# Patient Record
Sex: FEMALE | Race: WHITE | NOT HISPANIC OR LATINO | Employment: OTHER | URBAN - METROPOLITAN AREA
[De-identification: names, ages, dates, MRNs, and addresses within clinical notes are randomized per-mention and may not be internally consistent; named-entity substitution may affect disease eponyms.]

---

## 2017-10-13 ENCOUNTER — HOSPITAL ENCOUNTER (OUTPATIENT)
Dept: RADIOLOGY | Facility: HOSPITAL | Age: 67
Discharge: HOME | End: 2017-10-13
Attending: UROLOGY
Payer: MEDICARE

## 2017-10-13 ENCOUNTER — TRANSCRIBE ORDERS (OUTPATIENT)
Dept: ADMINISTRATIVE | Facility: HOSPITAL | Age: 67
End: 2017-10-13

## 2017-10-13 DIAGNOSIS — Z87.442 PERSONAL HISTORY OF URINARY CALCULI: Primary | ICD-10-CM

## 2017-10-13 DIAGNOSIS — Z87.442 PERSONAL HISTORY OF URINARY CALCULI: ICD-10-CM

## 2017-10-13 PROCEDURE — 74000 HB X-RAY EXAM OF ABDOMEN (SINGLE ANTEROPOSTERIOR VIEW): CPT

## 2018-02-02 ENCOUNTER — TRANSCRIBE ORDERS (OUTPATIENT)
Dept: ADMINISTRATIVE | Facility: HOSPITAL | Age: 68
End: 2018-02-02

## 2018-02-02 DIAGNOSIS — R94.5 NONSPECIFIC ABNORMAL RESULTS OF LIVER FUNCTION STUDY: Primary | ICD-10-CM

## 2018-02-08 ENCOUNTER — HOSPITAL ENCOUNTER (OUTPATIENT)
Dept: RADIOLOGY | Facility: HOSPITAL | Age: 68
Discharge: HOME | End: 2018-02-08
Attending: INTERNAL MEDICINE
Payer: MEDICARE

## 2018-02-08 ENCOUNTER — TRANSCRIBE ORDERS (OUTPATIENT)
Dept: ADMINISTRATIVE | Facility: HOSPITAL | Age: 68
End: 2018-02-08

## 2018-02-08 DIAGNOSIS — D49.0 DUDLEY-KLINGENSTEIN SYNDROME: Primary | ICD-10-CM

## 2018-02-08 DIAGNOSIS — R49.8 NEUROLOGIC VOICE DISORDER: Primary | ICD-10-CM

## 2018-02-08 DIAGNOSIS — R94.5 NONSPECIFIC ABNORMAL RESULTS OF LIVER FUNCTION STUDY: ICD-10-CM

## 2018-02-08 PROCEDURE — 76705 ECHO EXAM OF ABDOMEN: CPT

## 2018-02-14 ENCOUNTER — HOSPITAL ENCOUNTER (OUTPATIENT)
Dept: RADIOLOGY | Facility: HOSPITAL | Age: 68
Discharge: HOME | End: 2018-02-14
Attending: INTERNAL MEDICINE
Payer: MEDICARE

## 2018-02-14 DIAGNOSIS — R49.8 NEUROLOGIC VOICE DISORDER: ICD-10-CM

## 2018-02-14 PROCEDURE — A9585 GADOBUTROL INJECTION: HCPCS | Performed by: RADIOLOGY

## 2018-02-14 PROCEDURE — 74183 MRI ABD W/O CNTR FLWD CNTR: CPT

## 2018-02-14 PROCEDURE — 76376 3D RENDER W/INTRP POSTPROCES: CPT

## 2018-02-14 RX ADMIN — GADOBUTROL 6 ML: 604.72 INJECTION INTRAVENOUS at 17:35

## 2018-09-10 ENCOUNTER — TRANSCRIBE ORDERS (OUTPATIENT)
Dept: ADMINISTRATIVE | Facility: HOSPITAL | Age: 68
End: 2018-09-10

## 2018-09-10 DIAGNOSIS — Z12.39 SCREENING BREAST EXAMINATION: ICD-10-CM

## 2018-09-10 DIAGNOSIS — M81.0 OSTEOPOROSIS, UNSPECIFIED OSTEOPOROSIS TYPE, UNSPECIFIED PATHOLOGICAL FRACTURE PRESENCE: Primary | ICD-10-CM

## 2018-09-18 ENCOUNTER — HOSPITAL ENCOUNTER (OUTPATIENT)
Dept: RADIOLOGY | Facility: HOSPITAL | Age: 68
Discharge: HOME | End: 2018-09-18
Attending: FAMILY MEDICINE
Payer: MEDICARE

## 2018-09-18 DIAGNOSIS — M81.0 OSTEOPOROSIS, UNSPECIFIED OSTEOPOROSIS TYPE, UNSPECIFIED PATHOLOGICAL FRACTURE PRESENCE: ICD-10-CM

## 2018-09-18 PROCEDURE — 77080 DXA BONE DENSITY AXIAL: CPT

## 2018-09-26 ENCOUNTER — HOSPITAL ENCOUNTER (OUTPATIENT)
Dept: RADIOLOGY | Facility: HOSPITAL | Age: 68
Discharge: HOME | End: 2018-09-26
Attending: FAMILY MEDICINE
Payer: MEDICARE

## 2018-09-26 DIAGNOSIS — Z12.39 SCREENING BREAST EXAMINATION: ICD-10-CM

## 2018-09-26 PROCEDURE — 77067 SCR MAMMO BI INCL CAD: CPT

## 2019-02-05 ENCOUNTER — TRANSCRIBE ORDERS (OUTPATIENT)
Dept: ADMINISTRATIVE | Facility: HOSPITAL | Age: 69
End: 2019-02-05

## 2019-02-05 DIAGNOSIS — K86.89 PANCREATIC MASS: Primary | ICD-10-CM

## 2019-02-08 ENCOUNTER — HOSPITAL ENCOUNTER (OUTPATIENT)
Dept: RADIOLOGY | Facility: HOSPITAL | Age: 69
Discharge: HOME | End: 2019-02-08
Attending: INTERNAL MEDICINE
Payer: MEDICARE

## 2019-02-08 DIAGNOSIS — K86.89 PANCREATIC MASS: ICD-10-CM

## 2019-02-08 PROCEDURE — 76705 ECHO EXAM OF ABDOMEN: CPT

## 2019-12-20 ENCOUNTER — TRANSCRIBE ORDERS (OUTPATIENT)
Dept: ADMINISTRATIVE | Facility: HOSPITAL | Age: 69
End: 2019-12-20

## 2019-12-20 DIAGNOSIS — Z12.31 VISIT FOR SCREENING MAMMOGRAM: Primary | ICD-10-CM

## 2020-01-28 ENCOUNTER — HOSPITAL ENCOUNTER (OUTPATIENT)
Dept: RADIOLOGY | Facility: HOSPITAL | Age: 70
Discharge: HOME | End: 2020-01-28
Attending: FAMILY MEDICINE
Payer: MEDICARE

## 2020-01-28 DIAGNOSIS — Z12.31 VISIT FOR SCREENING MAMMOGRAM: ICD-10-CM

## 2020-01-28 PROCEDURE — 77067 SCR MAMMO BI INCL CAD: CPT

## 2020-02-14 ENCOUNTER — HOSPITAL ENCOUNTER (OUTPATIENT)
Dept: RADIOLOGY | Facility: HOSPITAL | Age: 70
Discharge: HOME | End: 2020-02-14
Attending: FAMILY MEDICINE
Payer: MEDICARE

## 2020-02-14 VITALS — HEIGHT: 71 IN | BODY MASS INDEX: 18.9 KG/M2 | WEIGHT: 135 LBS

## 2020-02-14 DIAGNOSIS — R92.8 ABNORMAL SCREENING MAMMOGRAM: ICD-10-CM

## 2020-02-14 PROCEDURE — 76642 ULTRASOUND BREAST LIMITED: CPT

## 2020-02-14 PROCEDURE — 77065 DX MAMMO INCL CAD UNI: CPT

## 2020-02-14 PROCEDURE — G0279 TOMOSYNTHESIS, MAMMO: HCPCS

## 2021-03-01 DIAGNOSIS — Z23 ENCOUNTER FOR IMMUNIZATION: ICD-10-CM

## 2022-03-03 ENCOUNTER — TELEPHONE (OUTPATIENT)
Dept: GASTROENTEROLOGY | Facility: CLINIC | Age: 72
End: 2022-03-03

## 2022-03-03 NOTE — TELEPHONE ENCOUNTER
Recall call went out via [place] in 4/2021 with no return calls from pt to schedule  Pt is due for an appt with Dr Alyse Gross for f/u to liver/elevated lft's  I lmom for pt to please call back to schedule an appt with Dr Alyse Gross  Will call pt again in one week if do not hear back from her

## 2022-03-04 ENCOUNTER — TELEPHONE (OUTPATIENT)
Dept: GASTROENTEROLOGY | Facility: CLINIC | Age: 72
End: 2022-03-04

## 2022-03-04 NOTE — TELEPHONE ENCOUNTER
Spoke to pt whom was due for a colon with Dr Mariia Fleming for hx of polyps and she informed she had the cologuard test instead  When speaking to her she informed had MRI of Pancreas ordered by Dr Bella Pennington and she would like to know if cyst has grown or not  Could you please call her back after 1pm today as she will not be home until then  Thank you so much!

## 2022-03-22 ENCOUNTER — OFFICE VISIT (OUTPATIENT)
Dept: GASTROENTEROLOGY | Facility: CLINIC | Age: 72
End: 2022-03-22
Payer: MEDICARE

## 2022-03-22 VITALS
HEART RATE: 78 BPM | BODY MASS INDEX: 18.47 KG/M2 | SYSTOLIC BLOOD PRESSURE: 128 MMHG | WEIGHT: 129 LBS | HEIGHT: 70 IN | DIASTOLIC BLOOD PRESSURE: 82 MMHG

## 2022-03-22 DIAGNOSIS — Z12.11 ENCOUNTER FOR COLORECTAL CANCER SCREENING: ICD-10-CM

## 2022-03-22 DIAGNOSIS — Z12.12 ENCOUNTER FOR COLORECTAL CANCER SCREENING: ICD-10-CM

## 2022-03-22 DIAGNOSIS — K86.2 PANCREAS CYST: Primary | ICD-10-CM

## 2022-03-22 PROCEDURE — 99214 OFFICE O/P EST MOD 30 MIN: CPT | Performed by: PHYSICIAN ASSISTANT

## 2022-03-22 RX ORDER — GABAPENTIN 300 MG/1
300 CAPSULE ORAL 3 TIMES DAILY
COMMUNITY
Start: 2022-03-22

## 2022-03-22 NOTE — PROGRESS NOTES
[facility] - Outpatient Follow-up Note  Cata Bentley 66 y o  female MRN: [id number]  Encounter: [id number]          ASSESSMENT AND PLAN:      1  Pancreas cyst  Patient with history of stable subcentimeter pancreas cyst and did have stable imaging since 2013 although I do not have record of all of those they were compared by Radiology  This will be 9 years after diagnosis so if this remains stable and no further follow-up will be recommended but we will proceed with MRI MRCP  We will make further recommendations once this has been performed  Have asked patient to get blood work including kidney function through her primary doctor since she is going to see him and then schedule the MRI thereafter   - MRI abdomen w wo contrast and mrcp; Future    2  Encounter for colorectal cancer screening  Patient has had colonoscopy last in 2015 which had shown hyperplastic diminutive polyps  No family history of colorectal cancer  Patient did notice a slight change in bowel habits in the recent past I did tell her if this persists and she will need a colonoscopy sooner but otherwise we can plan for 2025  Advised her if she has any nocturnal symptoms or continued weight loss or rectal bleeding that she should contact us right away     ______________________________________________________________________    SUBJECTIVE:    This is a 58-year-old female who presents today for follow-up to pancreas cyst   Patient has had imaging done and last ultrasound was performed in 2019 and at that time she had a stable subcentimeter pancreatic cyst which was stable for 6 years and it had recommended yearly follow-up for 5 years in 2 years for 2 times and then if the cyst was stable after 9 years and no follow-up was recommended  Patient was actually due to have imaging in 2021 and this was either with an abdominal MRI or CT    Patient otherwise has lost some weight which she states she has not been eating as much   Had blood work last year and is going to follow up with Dr Jeffrey Chaudhary in the near future for blood work  She otherwise states that about 8 months ago she has noted some looser stools  Colonoscopy was last performed in 2015 which had shown some hyperplastic polyps  But they were diminutive  She otherwise was recommended to come back in 2022 but patient reports that she had a Cologuard within the past year which was negative  Patient denies any family history of colon cancer  She only has a bowel 1-2 loose stools a day  Has been trying probiotic as well as increasing fiber  Denies any change in medications but does just take supplements over-the-counter  REVIEW OF SYSTEMS IS OTHERWISE NEGATIVE  Historical Information   History reviewed  No pertinent past medical history  History reviewed  No pertinent surgical history  Social History   Social History     Substance and Sexual Activity   Alcohol Use Yes     Social History     Substance and Sexual Activity   Drug Use Never     Social History     Tobacco Use   Smoking Status Never Smoker   Smokeless Tobacco Never Used     Family History   Problem Relation Age of Onset    No Known Problems Mother     No Known Problems Father     No Known Problems Sister     No Known Problems Daughter     No Known Problems Maternal Grandmother     No Known Problems Maternal Grandfather     No Known Problems Paternal Grandmother     No Known Problems Paternal Grandfather     No Known Problems Sister     No Known Problems Sister     No Known Problems Sister     No Known Problems Paternal Aunt        Meds/Allergies       Current Outpatient Medications:     gabapentin (NEURONTIN) 300 mg capsule    No Known Allergies        Objective     Blood pressure 128/82, pulse 78, height 5' 10" (1 778 m), weight 58 5 kg (129 lb)  Body mass index is 18 51 kg/m²        PHYSICAL EXAM:      General Appearance:   Alert, cooperative, no distress   HEENT:   Normocephalic, atraumatic, anicteric      Neck:  Supple, symmetrical, trachea midline   Lungs:   Clear to auscultation bilaterally; no rales, rhonchi or wheezing; respirations unlabored    Heart[de-identified]   Regular rate and rhythm; no murmur, rub, or gallop  Abdomen:   Soft, non-tender, non-distended; normal bowel sounds; no masses, no organomegaly    Genitalia:   Deferred    Rectal:   Deferred    Extremities:  No cyanosis, clubbing or edema    Pulses:  2+ and symmetric    Skin:  No jaundice, rashes, or lesions    Lymph nodes:  No palpable cervical lymphadenopathy        Lab Results:   No visits with results within 1 Day(s) from this visit  Latest known visit with results is:   No results found for any previous visit  Radiology Results:   No results found

## 2022-03-23 ENCOUNTER — HOSPITAL ENCOUNTER (OUTPATIENT)
Dept: RADIOLOGY | Facility: HOSPITAL | Age: 72
Discharge: HOME | End: 2022-03-23
Attending: FAMILY MEDICINE
Payer: MEDICARE

## 2022-03-23 VITALS — HEIGHT: 69 IN | BODY MASS INDEX: 18.51 KG/M2 | WEIGHT: 125 LBS

## 2022-03-23 DIAGNOSIS — Z12.31 ENCOUNTER FOR SCREENING MAMMOGRAM FOR MALIGNANT NEOPLASM OF BREAST: ICD-10-CM

## 2022-03-23 DIAGNOSIS — Z13.820 ENCOUNTER FOR SCREENING FOR OSTEOPOROSIS: ICD-10-CM

## 2022-03-23 PROCEDURE — 77080 DXA BONE DENSITY AXIAL: CPT

## 2022-03-23 PROCEDURE — 77063 BREAST TOMOSYNTHESIS BI: CPT

## 2022-03-23 PROCEDURE — 77067 SCR MAMMO BI INCL CAD: CPT

## 2022-04-19 ENCOUNTER — TELEPHONE (OUTPATIENT)
Dept: GASTROENTEROLOGY | Facility: CLINIC | Age: 72
End: 2022-04-19

## 2022-04-19 NOTE — TELEPHONE ENCOUNTER
SPOKE WITH PT, REQUESTING BLOOD WORK RX FOR MRI/MRCP  RX MAILED TO PT, PREFERS TO HAVE THIS DONE AT [facility]

## 2022-08-01 ENCOUNTER — HOSPITAL ENCOUNTER (OUTPATIENT)
Dept: RADIOLOGY | Facility: HOSPITAL | Age: 72
Discharge: HOME | End: 2022-08-01
Payer: MEDICARE

## 2022-08-01 DIAGNOSIS — K86.2 PANCREAS CYST: ICD-10-CM

## 2022-08-01 PROCEDURE — A9585 GADOBUTROL INJECTION: HCPCS | Performed by: PHYSICIAN ASSISTANT

## 2022-08-01 PROCEDURE — 74183 MRI ABD W/O CNTR FLWD CNTR: CPT

## 2022-08-01 RX ADMIN — GADOBUTROL 6 ML: 604.72 INJECTION INTRAVENOUS at 15:52

## 2023-02-27 ENCOUNTER — TELEPHONE (OUTPATIENT)
Dept: GASTROENTEROLOGY | Facility: CLINIC | Age: 73
End: 2023-02-27

## 2023-02-27 NOTE — TELEPHONE ENCOUNTER
I spoke to pt and she will call back to r/s appt from 3/1/2023   Possibly 3/15/2023 or 3/16 with Jose Left in [place]   Thanks Kodi Galicia

## 2023-03-06 NOTE — TELEPHONE ENCOUNTER
Left message for patient to call and reschedule her 3/1 appointment that was cancelled due to change in schedule  Will try one more time in 2 weeks if she doesn't call to reschedule

## 2023-04-26 ENCOUNTER — OFFICE VISIT (OUTPATIENT)
Dept: FAMILY MEDICINE CLINIC | Facility: CLINIC | Age: 73
End: 2023-04-26

## 2023-04-26 VITALS
TEMPERATURE: 97.6 F | HEIGHT: 70 IN | BODY MASS INDEX: 18.56 KG/M2 | HEART RATE: 72 BPM | SYSTOLIC BLOOD PRESSURE: 120 MMHG | RESPIRATION RATE: 16 BRPM | DIASTOLIC BLOOD PRESSURE: 88 MMHG | WEIGHT: 129.6 LBS

## 2023-04-26 DIAGNOSIS — Z12.39 SCREENING BREAST EXAMINATION: ICD-10-CM

## 2023-04-26 DIAGNOSIS — Z00.00 MEDICARE ANNUAL WELLNESS VISIT, SUBSEQUENT: Primary | ICD-10-CM

## 2023-04-26 DIAGNOSIS — Z12.31 ENCOUNTER FOR SCREENING MAMMOGRAM FOR MALIGNANT NEOPLASM OF BREAST: ICD-10-CM

## 2023-04-26 DIAGNOSIS — Z13.6 SCREENING FOR CARDIOVASCULAR CONDITION: ICD-10-CM

## 2023-04-26 DIAGNOSIS — Z11.59 ENCOUNTER FOR HEPATITIS C SCREENING TEST FOR LOW RISK PATIENT: ICD-10-CM

## 2023-04-26 DIAGNOSIS — R53.83 OTHER FATIGUE: ICD-10-CM

## 2023-04-26 NOTE — PROGRESS NOTES
Assessment and Plan:     Problem List Items Addressed This Visit    None  Visit Diagnoses     Medicare annual wellness visit, subsequent    -  Primary    Other fatigue        Relevant Orders    TSH, 3rd generation with Free T4 reflex    CBC and differential    Screening for cardiovascular condition        Relevant Orders    Comprehensive metabolic panel    Lipid panel    Encounter for hepatitis C screening test for low risk patient        Relevant Orders    Hepatitis C antibody    Screening breast examination        Relevant Orders    Mammo screening bilateral w 3d & cad    Encounter for screening mammogram for malignant neoplasm of breast        Relevant Orders    Mammo screening bilateral w 3d & cad           Preventive health issues were discussed with patient, and age appropriate screening tests were ordered as noted in patient's After Visit Summary  Personalized health advice and appropriate referrals for health education or preventive services given if needed, as noted in patient's After Visit Summary  History of Present Illness:     Patient presents for a Medicare Wellness Visit    NP, here for AWV  Has history of bulging disc in the lumbar region  This gives her some pain but she uses topicals and feels she can control things  Declines PT  Has loose bm's for about a year, has tried bran, soluble and insoluble fiber, immodium, apple vinegar with lemon/honey/water  Up to date with colonoscopy  Watches her granddaughter 5 days a week and she is 3year old  Tried to get an appointment with Dr Dinora Crawford but it got cancelled  She is going to call again  Patient Care Team:  Kaylah Paulino MD as PCP - General (Internal Medicine)     Review of Systems:     Review of Systems   Constitutional: Negative for chills and fever  HENT: Negative for ear pain and sore throat  Eyes: Negative for pain and visual disturbance  Respiratory: Negative for cough and shortness of breath      Cardiovascular: Negative for chest pain and palpitations  Gastrointestinal: Negative for abdominal pain and vomiting  Genitourinary: Negative for dysuria and hematuria  Musculoskeletal: Positive for back pain  Negative for arthralgias  Skin: Negative for color change and rash  Neurological: Negative for seizures and syncope  All other systems reviewed and are negative  Problem List:     There is no problem list on file for this patient  Past Medical and Surgical History:     History reviewed  No pertinent past medical history    Past Surgical History:   Procedure Laterality Date   • EYE SURGERY     • HERNIA REPAIR     • KNEE SURGERY     • TONSILLECTOMY        Family History:     Family History   Problem Relation Age of Onset   • Alcohol abuse Mother    • Alcohol abuse Father    • No Known Problems Sister    • No Known Problems Sister    • No Known Problems Sister    • No Known Problems Sister    • No Known Problems Daughter    • No Known Problems Maternal Grandmother    • No Known Problems Maternal Grandfather    • No Known Problems Paternal Grandmother    • No Known Problems Paternal Grandfather    • No Known Problems Paternal Aunt       Social History:     Social History     Socioeconomic History   • Marital status: /Civil Union     Spouse name: None   • Number of children: None   • Years of education: None   • Highest education level: None   Occupational History   • None   Tobacco Use   • Smoking status: Never   • Smokeless tobacco: Never   Vaping Use   • Vaping Use: Never used   Substance and Sexual Activity   • Alcohol use: Yes     Comment: rarely   • Drug use: Never   • Sexual activity: Yes     Partners: Male   Other Topics Concern   • None   Social History Narrative   • None     Social Determinants of Health     Financial Resource Strain: Low Risk    • Difficulty of Paying Living Expenses: Not hard at all   Food Insecurity: Not on file   Transportation Needs: No Transportation Needs   • Lack of Transportation (Medical): No   • Lack of Transportation (Non-Medical): No   Physical Activity: Not on file   Stress: Not on file   Social Connections: Not on file   Intimate Partner Violence: Not on file   Housing Stability: Not on file      Medications and Allergies:     Current Outpatient Medications   Medication Sig Dispense Refill   • gabapentin (NEURONTIN) 300 mg capsule Take 300 mg by mouth Three times a day Patient is taking once daily at night       No current facility-administered medications for this visit  No Known Allergies   Immunizations:     Immunization History   Administered Date(s) Administered   • Td (adult), Unspecified 11/28/2014   • Tetanus Toxoid, Unspecified 12/31/2002      Health Maintenance:         Topic Date Due   • Hepatitis C Screening  Never done   • Colorectal Cancer Screening  03/20/2018   • Breast Cancer Screening: Mammogram  03/23/2023         Topic Date Due   • COVID-19 Vaccine (1) Never done   • Pneumococcal Vaccine: 65+ Years (1 - PCV) Never done   • Influenza Vaccine (1) Never done      Medicare Screening Tests and Risk Assessments:     Astrid Rossi is here for her Subsequent Wellness visit  Health Risk Assessment:   Patient rates overall health as very good  Patient feels that their physical health rating is same  Patient is satisfied with their life  Eyesight was rated as same  Hearing was rated as same  Patient feels that their emotional and mental health rating is same  Patients states they are never, rarely angry  Patient states they are sometimes unusually tired/fatigued  Pain experienced in the last 7 days has been some  Patient's pain rating has been 6/10  Patient states that she has experienced no weight loss or gain in last 6 months  Depression Screening:   PHQ-2 Score: 0      Fall Risk Screening:    In the past year, patient has experienced: no history of falling in past year      Urinary Incontinence Screening:   Patient has not leaked urine accidently in the last six months  Home Safety:  Patient does not have trouble with stairs inside or outside of their home  Patient has working smoke alarms and has working carbon monoxide detector  Home safety hazards include: none  Nutrition:   Current diet is Regular  Medications:   Patient is not currently taking any over-the-counter supplements  Patient is able to manage medications  Activities of Daily Living (ADLs)/Instrumental Activities of Daily Living (IADLs):   Walk and transfer into and out of bed and chair?: Yes  Dress and groom yourself?: Yes    Bathe or shower yourself?: Yes    Feed yourself? Yes  Do your laundry/housekeeping?: Yes  Manage your money, pay your bills and track your expenses?: Yes  Make your own meals?: Yes    Do your own shopping?: Yes    Previous Hospitalizations:   Any hospitalizations or ED visits within the last 12 months?: No      Advance Care Planning:   Living will: Yes    Durable POA for healthcare:  Yes    Advanced directive: Yes    End of Life Decisions reviewed with patient: Yes      Cognitive Screening:   Provider or family/friend/caregiver concerned regarding cognition?: No    PREVENTIVE SCREENINGS      Cardiovascular Screening:    General: Risks and Benefits Discussed    Due for: Lipid Panel      Diabetes Screening:     General: Risks and Benefits Discussed    Due for: Blood Glucose      Colorectal Cancer Screening:     General: Screening Current      Breast Cancer Screening:     General: Screening Current      Cervical Cancer Screening:    General: Screening Not Indicated      Osteoporosis Screening:    General: Screening Current      Abdominal Aortic Aneurysm (AAA) Screening:        General: Screening Not Indicated      Lung Cancer Screening:     General: Screening Not Indicated    Screening, Brief Intervention, and Referral to Treatment (SBIRT)    Screening  Typical number of drinks in a day: 0  Typical number of drinks in a week: 0  Interpretation: Low risk drinking "behavior  Single Item Drug Screening:  How often have you used an illegal drug (including marijuana) or a prescription medication for non-medical reasons in the past year? never    Single Item Drug Screen Score: 0  Interpretation: Negative screen for possible drug use disorder    Brief Intervention  Alcohol & drug use screenings were reviewed  No concerns regarding substance use disorder identified  Other Counseling Topics:   Car/seat belt/driving safety, skin self-exam, sunscreen and regular weightbearing exercise and calcium and vitamin D intake  No results found  Physical Exam:     /88   Pulse 72   Temp 97 6 °F (36 4 °C) (Tympanic)   Resp 16   Ht 5' 9 5\" (1 765 m)   Wt 58 8 kg (129 lb 9 6 oz)   BMI 18 86 kg/m²     Physical Exam  Vitals and nursing note reviewed  Constitutional:       General: She is not in acute distress  Appearance: She is well-developed  HENT:      Head: Normocephalic and atraumatic  Eyes:      Conjunctiva/sclera: Conjunctivae normal    Cardiovascular:      Rate and Rhythm: Normal rate and regular rhythm  Heart sounds: No murmur heard  Pulmonary:      Effort: Pulmonary effort is normal  No respiratory distress  Breath sounds: Normal breath sounds  Abdominal:      Palpations: Abdomen is soft  Tenderness: There is no abdominal tenderness  Musculoskeletal:         General: No swelling  Cervical back: Neck supple  Skin:     General: Skin is warm and dry  Capillary Refill: Capillary refill takes less than 2 seconds  Neurological:      Mental Status: She is alert     Psychiatric:         Mood and Affect: Mood normal           Francis Orozco MD  "

## 2023-04-26 NOTE — PATIENT INSTRUCTIONS
Medicare Preventive Visit Patient Instructions  Thank you for completing your Welcome to Medicare Visit or Medicare Annual Wellness Visit today  Your next wellness visit will be due in one year (4/26/2024)  The screening/preventive services that you may require over the next 5-10 years are detailed below  Some tests may not apply to you based off risk factors and/or age  Screening tests ordered at today's visit but not completed yet may show as past due  Also, please note that scanned in results may not display below  Preventive Screenings:  Service Recommendations Previous Testing/Comments   Colorectal Cancer Screening  * Colonoscopy    * Fecal Occult Blood Test (FOBT)/Fecal Immunochemical Test (FIT)  * Fecal DNA/Cologuard Test  * Flexible Sigmoidoscopy Age: 39-70 years old   Colonoscopy: every 10 years (may be performed more frequently if at higher risk)  OR  FOBT/FIT: every 1 year  OR  Cologuard: every 3 years  OR  Sigmoidoscopy: every 5 years  Screening may be recommended earlier than age 39 if at higher risk for colorectal cancer  Also, an individualized decision between you and your healthcare provider will decide whether screening between the ages of 74-80 would be appropriate  Colonoscopy: 03/24/2015  FOBT/FIT: Not on file  Cologuard: Not on file  Sigmoidoscopy: Not on file    Screening Current     Breast Cancer Screening Age: 36 years old  Frequency: every 1-2 years  Not required if history of left and right mastectomy Mammogram: 03/23/2022    Screening Current   Cervical Cancer Screening Between the ages of 21-29, pap smear recommended once every 3 years  Between the ages of 33-67, can perform pap smear with HPV co-testing every 5 years     Recommendations may differ for women with a history of total hysterectomy, cervical cancer, or abnormal pap smears in past  Pap Smear: Not on file    Screening Not Indicated   Hepatitis C Screening Once for adults born between 1945 and 1965  More frequently in patients at high risk for Hepatitis C Hep C Antibody: Not on file        Diabetes Screening 1-2 times per year if you're at risk for diabetes or have pre-diabetes Fasting glucose: No results in last 5 years (No results in last 5 years)  A1C: No results in last 5 years (No results in last 5 years)      Cholesterol Screening Once every 5 years if you don't have a lipid disorder  May order more often based on risk factors  Lipid panel: Not on file          Other Preventive Screenings Covered by Medicare:  Abdominal Aortic Aneurysm (AAA) Screening: covered once if your at risk  You're considered to be at risk if you have a family history of AAA  Lung Cancer Screening: covers low dose CT scan once per year if you meet all of the following conditions: (1) Age 50-69; (2) No signs or symptoms of lung cancer; (3) Current smoker or have quit smoking within the last 15 years; (4) You have a tobacco smoking history of at least 20 pack years (packs per day multiplied by number of years you smoked); (5) You get a written order from a healthcare provider  Glaucoma Screening: covered annually if you're considered high risk: (1) You have diabetes OR (2) Family history of glaucoma OR (3)  aged 48 and older OR (3)  American aged 72 and older  Osteoporosis Screening: covered every 2 years if you meet one of the following conditions: (1) You're estrogen deficient and at risk for osteoporosis based off medical history and other findings; (2) Have a vertebral abnormality; (3) On glucocorticoid therapy for more than 3 months; (4) Have primary hyperparathyroidism; (5) On osteoporosis medications and need to assess response to drug therapy  Last bone density test (DXA Scan): 03/23/2022  HIV Screening: covered annually if you're between the age of 12-76  Also covered annually if you are younger than 13 and older than 72 with risk factors for HIV infection   For pregnant patients, it is covered up to 3 times per pregnancy  Immunizations:  Immunization Recommendations   Influenza Vaccine Annual influenza vaccination during flu season is recommended for all persons aged >= 6 months who do not have contraindications   Pneumococcal Vaccine   * Pneumococcal conjugate vaccine = PCV13 (Prevnar 13), PCV15 (Vaxneuvance), PCV20 (Prevnar 20)  * Pneumococcal polysaccharide vaccine = PPSV23 (Pneumovax) Adults 25-60 years old: 1-3 doses may be recommended based on certain risk factors  Adults 72 years old: 1-2 doses may be recommended based off what pneumonia vaccine you previously received   Hepatitis B Vaccine 3 dose series if at intermediate or high risk (ex: diabetes, end stage renal disease, liver disease)   Tetanus (Td) Vaccine - COST NOT COVERED BY MEDICARE PART B Following completion of primary series, a booster dose should be given every 10 years to maintain immunity against tetanus  Td may also be given as tetanus wound prophylaxis  Tdap Vaccine - COST NOT COVERED BY MEDICARE PART B Recommended at least once for all adults  For pregnant patients, recommended with each pregnancy  Shingles Vaccine (Shingrix) - COST NOT COVERED BY MEDICARE PART B  2 shot series recommended in those aged 48 and above     Health Maintenance Due:      Topic Date Due    Hepatitis C Screening  Never done    Colorectal Cancer Screening  03/20/2018    Breast Cancer Screening: Mammogram  03/23/2023     Immunizations Due:      Topic Date Due    COVID-19 Vaccine (1) Never done    Pneumococcal Vaccine: 65+ Years (1 - PCV) Never done    Influenza Vaccine (1) Never done     Advance Directives   What are advance directives? Advance directives are legal documents that state your wishes and plans for medical care  These plans are made ahead of time in case you lose your ability to make decisions for yourself  Advance directives can apply to any medical decision, such as the treatments you want, and if you want to donate organs     What are the types of advance directives? There are many types of advance directives, and each state has rules about how to use them  You may choose a combination of any of the following:  Living will: This is a written record of the treatment you want  You can also choose which treatments you do not want, which to limit, and which to stop at a certain time  This includes surgery, medicine, IV fluid, and tube feedings  Durable power of  for healthcare [facility]): This is a written record that states who you want to make healthcare choices for you when you are unable to make them for yourself  This person, called a proxy, is usually a family member or a friend  You may choose more than 1 proxy  Do not resuscitate (DNR) order:  A DNR order is used in case your heart stops beating or you stop breathing  It is a request not to have certain forms of treatment, such as CPR  A DNR order may be included in other types of advance directives  Medical directive: This covers the care that you want if you are in a coma, near death, or unable to make decisions for yourself  You can list the treatments you want for each condition  Treatment may include pain medicine, surgery, blood transfusions, dialysis, IV or tube feedings, and a ventilator (breathing machine)  Values history: This document has questions about your views, beliefs, and how you feel and think about life  This information can help others choose the care that you would choose  Why are advance directives important? An advance directive helps you control your care  Although spoken wishes may be used, it is better to have your wishes written down  Spoken wishes can be misunderstood, or not followed  Treatments may be given even if you do not want them  An advance directive may make it easier for your family to make difficult choices about your care         © Copyright [employer] 2018 Information is for End User's use only and may not be sold, redistributed or otherwise used for commercial purposes   All illustrations and images included in CareNotes® are the copyrighted property of [employer]  or [address] [place] [address]

## 2023-05-06 ENCOUNTER — APPOINTMENT (OUTPATIENT)
Dept: LAB | Facility: HOSPITAL | Age: 73
End: 2023-05-06
Attending: INTERNAL MEDICINE

## 2023-05-06 DIAGNOSIS — R53.83 OTHER FATIGUE: ICD-10-CM

## 2023-05-06 DIAGNOSIS — Z13.6 SCREENING FOR CARDIOVASCULAR CONDITION: ICD-10-CM

## 2023-05-06 DIAGNOSIS — Z11.59 ENCOUNTER FOR HEPATITIS C SCREENING TEST FOR LOW RISK PATIENT: ICD-10-CM

## 2023-05-06 LAB
ALBUMIN SERPL BCP-MCNC: 4.2 G/DL (ref 3.5–5)
ALP SERPL-CCNC: 68 U/L (ref 34–104)
ALT SERPL W P-5'-P-CCNC: 23 U/L (ref 7–52)
ANION GAP SERPL CALCULATED.3IONS-SCNC: 5 MMOL/L (ref 4–13)
AST SERPL W P-5'-P-CCNC: 26 U/L (ref 13–39)
BASOPHILS # BLD AUTO: 0.03 THOUSANDS/ÂΜL (ref 0–0.1)
BASOPHILS NFR BLD AUTO: 1 % (ref 0–1)
BILIRUB SERPL-MCNC: 0.71 MG/DL (ref 0.2–1)
BUN SERPL-MCNC: 21 MG/DL (ref 5–25)
CALCIUM SERPL-MCNC: 9.3 MG/DL (ref 8.4–10.2)
CHLORIDE SERPL-SCNC: 104 MMOL/L (ref 96–108)
CHOLEST SERPL-MCNC: 201 MG/DL
CO2 SERPL-SCNC: 30 MMOL/L (ref 21–32)
CREAT SERPL-MCNC: 0.88 MG/DL (ref 0.6–1.3)
EOSINOPHIL # BLD AUTO: 0.12 THOUSAND/ÂΜL (ref 0–0.61)
EOSINOPHIL NFR BLD AUTO: 3 % (ref 0–6)
ERYTHROCYTE [DISTWIDTH] IN BLOOD BY AUTOMATED COUNT: 13.2 % (ref 11.6–15.1)
GFR SERPL CREATININE-BSD FRML MDRD: 65 ML/MIN/1.73SQ M
GLUCOSE P FAST SERPL-MCNC: 79 MG/DL (ref 65–99)
HCT VFR BLD AUTO: 43 % (ref 34.8–46.1)
HCV AB SER QL: NORMAL
HDLC SERPL-MCNC: 79 MG/DL
HGB BLD-MCNC: 14.1 G/DL (ref 11.5–15.4)
IMM GRANULOCYTES # BLD AUTO: 0.01 THOUSAND/UL (ref 0–0.2)
IMM GRANULOCYTES NFR BLD AUTO: 0 % (ref 0–2)
LDLC SERPL CALC-MCNC: 109 MG/DL (ref 0–100)
LYMPHOCYTES # BLD AUTO: 1.87 THOUSANDS/ÂΜL (ref 0.6–4.47)
LYMPHOCYTES NFR BLD AUTO: 41 % (ref 14–44)
MCH RBC QN AUTO: 32.5 PG (ref 26.8–34.3)
MCHC RBC AUTO-ENTMCNC: 32.8 G/DL (ref 31.4–37.4)
MCV RBC AUTO: 99 FL (ref 82–98)
MONOCYTES # BLD AUTO: 0.41 THOUSAND/ÂΜL (ref 0.17–1.22)
MONOCYTES NFR BLD AUTO: 9 % (ref 4–12)
NEUTROPHILS # BLD AUTO: 2.15 THOUSANDS/ÂΜL (ref 1.85–7.62)
NEUTS SEG NFR BLD AUTO: 46 % (ref 43–75)
NONHDLC SERPL-MCNC: 122 MG/DL
NRBC BLD AUTO-RTO: 0 /100 WBCS
PLATELET # BLD AUTO: 218 THOUSANDS/UL (ref 149–390)
PMV BLD AUTO: 9.9 FL (ref 8.9–12.7)
POTASSIUM SERPL-SCNC: 4.4 MMOL/L (ref 3.5–5.3)
PROT SERPL-MCNC: 7.1 G/DL (ref 6.4–8.4)
RBC # BLD AUTO: 4.34 MILLION/UL (ref 3.81–5.12)
SODIUM SERPL-SCNC: 139 MMOL/L (ref 135–147)
TRIGL SERPL-MCNC: 66 MG/DL
TSH SERPL DL<=0.05 MIU/L-ACNC: 2.5 UIU/ML (ref 0.45–4.5)
WBC # BLD AUTO: 4.59 THOUSAND/UL (ref 4.31–10.16)

## 2023-08-23 ENCOUNTER — OFFICE VISIT (OUTPATIENT)
Dept: FAMILY MEDICINE CLINIC | Facility: CLINIC | Age: 73
End: 2023-08-23
Payer: MEDICARE

## 2023-08-23 VITALS
HEIGHT: 70 IN | WEIGHT: 132.8 LBS | DIASTOLIC BLOOD PRESSURE: 88 MMHG | RESPIRATION RATE: 18 BRPM | HEART RATE: 99 BPM | TEMPERATURE: 97 F | SYSTOLIC BLOOD PRESSURE: 132 MMHG | BODY MASS INDEX: 19.01 KG/M2

## 2023-08-23 DIAGNOSIS — M54.50 CHRONIC MIDLINE LOW BACK PAIN WITHOUT SCIATICA: ICD-10-CM

## 2023-08-23 DIAGNOSIS — M79.10 MYALGIA: ICD-10-CM

## 2023-08-23 DIAGNOSIS — G89.29 CHRONIC MIDLINE LOW BACK PAIN WITHOUT SCIATICA: ICD-10-CM

## 2023-08-23 DIAGNOSIS — R53.83 OTHER FATIGUE: Primary | ICD-10-CM

## 2023-08-23 PROCEDURE — 99213 OFFICE O/P EST LOW 20 MIN: CPT | Performed by: NURSE PRACTITIONER

## 2023-08-23 RX ORDER — GABAPENTIN 300 MG/1
300 CAPSULE ORAL
Qty: 90 CAPSULE | Refills: 0 | Status: SHIPPED | OUTPATIENT
Start: 2023-08-23

## 2023-08-23 RX ORDER — CLOBETASOL PROPIONATE 0.46 MG/ML
SOLUTION TOPICAL AS NEEDED
COMMUNITY
Start: 2023-06-01

## 2023-08-23 RX ORDER — DOXYCYCLINE HYCLATE 100 MG/1
100 CAPSULE ORAL EVERY 12 HOURS SCHEDULED
Qty: 20 CAPSULE | Refills: 0 | Status: SHIPPED | OUTPATIENT
Start: 2023-08-23 | End: 2023-09-02

## 2023-08-23 NOTE — PATIENT INSTRUCTIONS
Doxycycline (By mouth)   Doxycycline ([id number])  Treats and prevents infections. Also used to prevent malaria and treat rosacea or severe acne. This medicine is a tetracycline antibiotic. Brand Name(s): Acticlate, Adoxa, Adoxa Ronald 1/150, Avidoxy, Doryx, Doryx MPC, LymePak, Mondoxyne NL, Monodox, Morgidox [id number], Morgidox [id number], Oracea, Targadox, Vibramycin Calcium, Vibramycin Hyclate   There may be other brand names for this medicine. When This Medicine Should Not Be Used: This medicine is not right for everyone. Do not use it if you had an allergic reaction to doxycycline or another tetracycline antibiotic, or if you are pregnant or breastfeeding. How to Use This Medicine:   Capsule, Delayed Release Capsule, Long Acting Capsule, Liquid, Tablet, Delayed Release Tablet  Your doctor will tell you how much medicine to use. Do not use more than directed. Ask your pharmacist or doctor if you need to take this medicine with or without food. Some forms can be taken with food or milk, but others must be taken on an empty stomach. Capsule: Swallow whole. Do not break, crush, chew, or open it. Oracea® capsules: This medicine must be taken on an empty stomach, at least 1 hour before or 2 hours after a meal.  Acticlate® Cap capsules: You may take this medicine with food or milk to avoid stomach irritation. Delayed-release capsules: You may also take this medicine by sprinkling the open capsules onto cold, soft applesauce. Do not lose any pellets when transferring the contents. Swallow this mixture right away. Do not chew it. Do not store the mixture for later use. You may take this medicine with food or milk to avoid stomach upset. Delayed-release tablets: You may also take this medicine by sprinkling the broken tablets onto room-temperature applesauce. Swallow this mixture right away. Do not chew it. Do not store the mixture for later use.  You may take this medicine with food or milk to avoid stomach upset.  Oral liquid: Shake the bottle well just before each use. Measure the oral liquid medicine with a marked measuring spoon, oral syringe, or medicine cup. Tablets: You may take this medicine with food or milk to avoid stomach irritation. To break a tablet, hold the tablet between your thumb and index fingers close to the appropriate scored line. Then, apply enough pressure to snap the tablet segments apart. Do not use the tablet if it does not break on the scored lines. Take all of the medicine in your prescription to clear up your infection, even if you feel better after the first few doses. Drink plenty of fluids to avoid throat problems, if you take the capsule or tablet form. Malaria prevention: Start taking the medicine 1 or 2 days before you travel. Take the medicine every day during your trip. Keep taking it for 4 weeks after you return. However, do not use the medicine for longer than 4 months. Do not use this medicine for more than 9 months if you are using it for rosacea. Use only the brand of medicine your doctor prescribed. Other brands may not work the same way. Read and follow the patient instructions that come with this medicine. Talk to your doctor or pharmacist if you have any questions. Missed dose: Take a dose as soon as you remember. If it is almost time for your next dose, wait until then and take a regular dose. Do not take extra medicine to make up for a missed dose. Store the medicine in a closed container at room temperature, away from heat, moisture, and direct light. Do not freeze the oral liquid. Drugs and Foods to Avoid:   Ask your doctor or pharmacist before using any other medicine, including over-the-counter medicines, vitamins, and herbal products. Some medicines can affect how doxycycline works.  Tell your doctor if you are using any of the following:  Bismuth subsalicylate  Acne medicines (including isotretinoin)  Birth control pills  Blood thinner (including warfarin)  Medicine for seizures (including carbamazepine, phenobarbital, phenytoin)  Medicine that contains aluminum, calcium, magnesium, or iron (including an antacid or vitamin supplement)  Medicine to treat psoriasis (including acitretin)  Penicillin antibiotic  Stomach medicine  Warnings While Using This Medicine: This medicine may cause birth defects if either partner is using it during conception or pregnancy. Tell your doctor right away if you or your partner becomes pregnant. Birth control pills may not work as well when used together with this medicine. Use a second form of birth control to keep from getting pregnant. Tell your doctor if you have kidney disease, liver disease, asthma, or an allergy to sulfites. Tell your doctor if you had surgery on your stomach, or if you have a history of yeast infections. This medicine may cause the following problems:  Permanent change in tooth color (in children younger than 6years of age)  Serious skin reactions, including drug reaction with eosinophilia and systemic symptoms (DRESS)  Increased pressure inside the head  Yeast infection  Immune system problems  This medicine can cause diarrhea. Call your doctor if the diarrhea becomes severe, does not stop, or is bloody. Do not take any medicine to stop diarrhea until you have talked to your doctor. Diarrhea can occur 2 months or more after you stop taking this medicine. This medicine may make your skin more sensitive to sunlight. Wear sunscreen. Do not use sunlamps or tanning beds. Tell any doctor or dentist who treats you that you are using this medicine. This medicine may affect certain medical test results. Call your doctor if your symptoms do not improve or if they get worse. Your doctor will do lab tests at regular visits to check on the effects of this medicine. Keep all appointments. Keep all medicine out of the reach of children. Never share your medicine with anyone.   Possible Side Effects While Using This Medicine:   Call your doctor right away if you notice any of these side effects: Allergic reaction: Itching or hives, swelling in your face or hands, swelling or tingling in your mouth or throat, chest tightness, trouble breathing  Blistering, peeling, red skin rash  Burning, pain, or irritation in your upper stomach or throat  Diarrhea that may contain blood  Fever, chills, cough, runny or stuffy nose, sore throat, body aches  Joint pain, unusual tiredness or weakness  Severe headache, dizziness, vision changes  Sudden and severe stomach pain, nausea, vomiting, lightheadedness  Swollen, painful, or tender lymph glands in the neck, armpit, or groin, or yellow skin or eyes  If you notice these less serious side effects, talk with your doctor:   Darkening of your skin, scars, teeth, or gums  Sores or white patches on your lips, mouth, or throat  If you notice other side effects that you think are caused by this medicine, tell your doctor. Call your doctor for medical advice about side effects. You may report side effects to FDA at 5-347-FDA-4830  © Copyright Kat Ranks 2022 Information is for End User's use only and may not be sold, redistributed or otherwise used for commercial purposes. The above information is an  only. It is not intended as medical advice for individual conditions or treatments. Talk to your doctor, nurse or pharmacist before following any medical regimen to see if it is safe and effective for you.

## 2023-08-23 NOTE — PROGRESS NOTES
Assessment/Plan:    1. Other fatigue  -     doxycycline hyclate (VIBRAMYCIN) 100 mg capsule; Take 1 capsule (100 mg total) by mouth every 12 (twelve) hours for 10 days    2. Myalgia  -     doxycycline hyclate (VIBRAMYCIN) 100 mg capsule; Take 1 capsule (100 mg total) by mouth every 12 (twelve) hours for 10 days    3. Chronic midline low back pain without sciatica  -     gabapentin (Neurontin) 300 mg capsule; Take 1 capsule (300 mg total) by mouth daily at bedtime              Patient Instructions:  Supportive care discussed and advised. Advised to RTO for any worsening and no improvement. Follow up for no improvement and worsening of conditions. Patient advised and educated when to see immediate medical care. Return if symptoms worsen or fail to improve. Future Appointments   Date Time Provider [address] [place] [address]   4/25/2024  4:00 PM Angelica Bryant MD [facility]           Subjective:      Patient ID: Pablo Daily is a 67 y.o. female. Chief Complaint   Patient presents with   • Generalized Body Aches     Possible lymes, has had multiple times in the past Andres Gupta LPN     • Fatigue         Vitals:  /88   Pulse 99   Temp (!) 97 °F (36.1 °C)   Resp 18   Ht 5' 9.5" (1.765 m)   Wt 60.2 kg (132 lb 12.8 oz)   BMI 19.33 kg/m²     HPI  Patient stated that feeling fatigue from couple of days and stated that also having bodyaches and has h/o lyme disease and stated that very concerned about that. Denies any rash and fever. Recommended to do blood work but patient declines and does not want to wait and would like to start treatment ASAP. Also takes gabapentin at night time for lower back ache and needs refill on that.        The following portions of the patient's history were reviewed and updated as appropriate: allergies, current medications, past family history, past medical history, past social history, past surgical history and problem list.      Review of Systems Constitutional: Positive for fatigue. HENT: Negative. Respiratory: Negative. Cardiovascular: Negative. Musculoskeletal: Positive for back pain and myalgias. Objective:    Social History     Tobacco Use   Smoking Status Never   Smokeless Tobacco Never       Allergies: No Known Allergies      Current Outpatient Medications   Medication Sig Dispense Refill   • clobetasol (TEMOVATE) 0.05 % external solution if needed     • doxycycline hyclate (VIBRAMYCIN) 100 mg capsule Take 1 capsule (100 mg total) by mouth every 12 (twelve) hours for 10 days 20 capsule 0   • gabapentin (Neurontin) 300 mg capsule Take 1 capsule (300 mg total) by mouth daily at bedtime 90 capsule 0   • hydrocortisone 2.5 % cream if needed       No current facility-administered medications for this visit. Physical Exam  Constitutional:       Appearance: Normal appearance. HENT:      Head: Normocephalic and atraumatic. Nose: Nose normal.   Eyes:      Conjunctiva/sclera: Conjunctivae normal.   Cardiovascular:      Rate and Rhythm: Normal rate and regular rhythm. Pulses: Normal pulses. Heart sounds: Normal heart sounds. Pulmonary:      Effort: Pulmonary effort is normal.      Breath sounds: Normal breath sounds. Skin:     General: Skin is warm and dry. Findings: No rash. Neurological:      Mental Status: She is alert and oriented to person, place, and time. Psychiatric:         Mood and Affect: Mood normal.         Behavior: Behavior normal.         Thought Content:  Thought content normal.         Judgment: Judgment normal.                     SERGIO Helm

## 2023-12-06 DIAGNOSIS — G89.29 CHRONIC MIDLINE LOW BACK PAIN WITHOUT SCIATICA: ICD-10-CM

## 2023-12-06 DIAGNOSIS — M54.50 CHRONIC MIDLINE LOW BACK PAIN WITHOUT SCIATICA: ICD-10-CM

## 2023-12-06 RX ORDER — GABAPENTIN 300 MG/1
300 CAPSULE ORAL
Qty: 90 CAPSULE | Refills: 0 | Status: SHIPPED | OUTPATIENT
Start: 2023-12-06

## 2023-12-06 NOTE — TELEPHONE ENCOUNTER
Reason for call:   [x] Refill   [] Prior Auth  [] Other:     Office:   [x] PCP/Provider -   [] Specialty/Provider -     Medication: Gabapentin    Dose/Frequency: 300mg    Quantity: #90    Pharmacy: [facility]    Does the patient have enough for 3 days?    [x] Yes   [] No - Send as HP to POD

## 2024-03-14 DIAGNOSIS — M54.50 CHRONIC MIDLINE LOW BACK PAIN WITHOUT SCIATICA: ICD-10-CM

## 2024-03-14 DIAGNOSIS — G89.29 CHRONIC MIDLINE LOW BACK PAIN WITHOUT SCIATICA: ICD-10-CM

## 2024-03-14 RX ORDER — GABAPENTIN 300 MG/1
300 CAPSULE ORAL
Qty: 90 CAPSULE | Refills: 0 | Status: SHIPPED | OUTPATIENT
Start: 2024-03-14

## 2024-03-14 NOTE — TELEPHONE ENCOUNTER
Reason for call:   [x] Refill   [] Prior Auth  [] Other:     Office:   [x] PCP/Provider - Janet Alatorre  [] Specialty/Provider -     Medication:       Quantity: 90    Pharmacy: [facility]    Does the patient have enough for 3 days?   [x] Yes   [] No - Send as HP to POD

## 2024-05-09 ENCOUNTER — TELEPHONE (OUTPATIENT)
Dept: FAMILY MEDICINE CLINIC | Facility: CLINIC | Age: 74
End: 2024-05-09

## 2024-06-14 DIAGNOSIS — G89.29 CHRONIC MIDLINE LOW BACK PAIN WITHOUT SCIATICA: ICD-10-CM

## 2024-06-14 DIAGNOSIS — M54.50 CHRONIC MIDLINE LOW BACK PAIN WITHOUT SCIATICA: ICD-10-CM

## 2024-06-14 RX ORDER — GABAPENTIN 300 MG/1
300 CAPSULE ORAL
Qty: 90 CAPSULE | Refills: 1 | Status: SHIPPED | OUTPATIENT
Start: 2024-06-14

## 2024-06-14 NOTE — TELEPHONE ENCOUNTER
Reason for call:   [x] Refill   [] Prior Auth  [] Other:     Office:   [x] PCP/Provider - SERGIO Sands   [] Specialty/Provider -     Medication: gabapentin (Neurontin) 300 mg capsule     Dose/Frequency: Take 1 capsule (300 mg total) by mouth daily at bedtime     Quantity: 90    Pharmacy: [facility] [phone]     Does the patient have enough for 3 days?   [x] Yes   [] No - Send as HP to POD

## 2024-06-17 ENCOUNTER — TELEPHONE (OUTPATIENT)
Age: 74
End: 2024-06-17

## 2024-06-17 NOTE — TELEPHONE ENCOUNTER
Patient called in to report flare of her lyme's disease/ Reports mild to moderate bone and, joint pain; trouble sleeping last night. Patient is requesting an order for an antibiotic be sent to [facility]. Please follow up with provider response for order, OV, or care advice.

## 2024-06-18 ENCOUNTER — RA CDI HCC (OUTPATIENT)
Dept: OTHER | Facility: HOSPITAL | Age: 74
End: 2024-06-18

## 2024-06-18 NOTE — TELEPHONE ENCOUNTER
Patient returned call to follow up on call yesterday to report lyme disease flare and request for antibiotic order. Patient not informed of PCP request for OV and labs. No OV available today or tomorrow. Patient is anxious for symptoms to not progress and worsen. Patient has  MAW OV on 6/26. Please follow up with patient for resolution to schedule OV or order for antibiotic.

## 2024-06-18 NOTE — TELEPHONE ENCOUNTER
Per Dr. Galvez, she would like the patient to have an appointment.  Please see previous message  Thank you,  Dr. Bradley

## 2024-06-20 ENCOUNTER — OFFICE VISIT (OUTPATIENT)
Dept: FAMILY MEDICINE CLINIC | Facility: CLINIC | Age: 74
End: 2024-06-20
Payer: MEDICARE

## 2024-06-20 ENCOUNTER — OFFICE VISIT (OUTPATIENT)
Dept: PHYSICAL THERAPY | Facility: CLINIC | Age: 74
End: 2024-06-20
Payer: MEDICARE

## 2024-06-20 VITALS
TEMPERATURE: 97.8 F | HEIGHT: 70 IN | RESPIRATION RATE: 16 BRPM | WEIGHT: 128 LBS | BODY MASS INDEX: 18.32 KG/M2 | HEART RATE: 92 BPM | SYSTOLIC BLOOD PRESSURE: 118 MMHG | DIASTOLIC BLOOD PRESSURE: 70 MMHG

## 2024-06-20 DIAGNOSIS — B07.0 PLANTAR WART OF RIGHT FOOT: ICD-10-CM

## 2024-06-20 DIAGNOSIS — M79.10 MYALGIA: Primary | ICD-10-CM

## 2024-06-20 DIAGNOSIS — M54.16 LUMBAR RADICULOPATHY: Primary | ICD-10-CM

## 2024-06-20 PROCEDURE — 17110 DESTRUCTION B9 LES UP TO 14: CPT | Performed by: FAMILY MEDICINE

## 2024-06-20 PROCEDURE — 99214 OFFICE O/P EST MOD 30 MIN: CPT | Performed by: FAMILY MEDICINE

## 2024-06-20 PROCEDURE — 97161 PT EVAL LOW COMPLEX 20 MIN: CPT

## 2024-06-20 PROCEDURE — 97140 MANUAL THERAPY 1/> REGIONS: CPT

## 2024-06-20 RX ORDER — DOXYCYCLINE HYCLATE 100 MG
100 TABLET ORAL 2 TIMES DAILY
Qty: 20 TABLET | Refills: 0 | Status: SHIPPED | OUTPATIENT
Start: 2024-06-20 | End: 2024-06-30

## 2024-06-20 NOTE — PROGRESS NOTES
"Ambulatory Visit  Name: Therese Iglesias      : 1950      MRN: [id number]  Encounter Provider: Yojana Alcala MD  Encounter Date: 2024   Encounter department: [facility]    Assessment & Plan   1. Myalgia  -     Lyme Total AB W Reflex to IGM/IGG; Future  -     doxycycline hyclate (VIBRA-TABS) 100 mg tablet; Take 1 tablet (100 mg total) by mouth 2 (two) times a day for 10 days  2. Plantar wart of right foot  Lesion Destruction    Date/Time: 2024 10:00 AM    Performed by: Yojana Alcala MD  Authorized by: Yojana Alcala MD  Universal Protocol:  Consent: Verbal consent obtained.  Consent given by: patient    Procedure Details - Lesion Destruction:     Number of Lesions:  1  Lesion 1:     Body area:  Lower extremity    Lower extremity location:  R big toe    Malignancy: benign lesion      Destruction method: cryotherapy           History of Present Illness     HPI  She has history of lyme disease and thinks she has another flare of lyme disease. She is always outdoors and exposed to lyme.   She reports muscle aches, fatigue which began last week. Denies fevers/chills/headaches/rash.   Her lyme at times leads to EBV so she wants to be cautious.   Also has a plantar wart on right great toe which is causing pain.   Review of Systems   Constitutional:  Positive for fatigue.   HENT: Negative.     Eyes: Negative.    Respiratory: Negative.     Cardiovascular: Negative.    Gastrointestinal: Negative.    Endocrine: Negative.    Genitourinary: Negative.    Musculoskeletal:  Positive for myalgias.   Skin: Negative.         Plantar wart on right big toe   Allergic/Immunologic: Negative.    Neurological: Negative.    Hematological: Negative.    Psychiatric/Behavioral: Negative.         Objective     /70   Pulse 92   Temp 97.8 °F (36.6 °C) (Temporal)   Resp 16   Ht 5' 9.5\" (1.765 m)   Wt 58.1 kg (128 lb)   BMI 18.63 kg/m²     Physical Exam  Constitutional:       General: She is not in acute " distress.     Appearance: She is well-developed. She is not diaphoretic.   HENT:      Head: Normocephalic and atraumatic.   Cardiovascular:      Rate and Rhythm: Normal rate and regular rhythm.      Heart sounds: Normal heart sounds. No murmur heard.     No friction rub. No gallop.   Pulmonary:      Effort: Pulmonary effort is normal. No respiratory distress.      Breath sounds: Normal breath sounds. No wheezing or rales.   Chest:      Chest wall: No tenderness.   Musculoskeletal:         General: No deformity. Normal range of motion.      Cervical back: Normal range of motion and neck supple.   Skin:     General: Skin is warm and dry.      Comments: Plantar wart on right big toe   Neurological:      Mental Status: She is alert and oriented to person, place, and time.   Psychiatric:         Behavior: Behavior normal.         Thought Content: Thought content normal.         Judgment: Judgment normal.       Administrative Statements

## 2024-06-20 NOTE — PROGRESS NOTES
PT Evaluation     Today's date: 2024  Patient name: Therese Iglesias  : 1950  MRN: [id number]  Referring provider: Self, Referral  Dx:   Encounter Diagnosis     ICD-10-CM    1. Lumbar radiculopathy  M54.16             Assessment  Assessment details: Patient is a 73 y.o. Female who presents to skilled outpatient PT with referring diagnosis of lumbar radiculopathy. Primary movement impairment diagnosis of mobility and functional mobility deficits resulting in pathoanatomical symptoms of lumbar spine and B gluteal pain. The aforementioned impairments have limited the patient's ability to sit and drive or greater than an hour. Patient education performed during today's session included: HEP as noted on flow sheet, nature of lumbar radiculopathy, and postural education. Patient verbalized understanding of POC.    Impairments: Abnormal or restricted ROM, Impaired physical strength, Lacks appropriate HEP, and Pain with function  Understanding of Dx/Px/POC: Excellent  Prognosis: Excellent    Dry needling consent for reviewed and signed by patient. Pt educated on dry needling to include but not limited to: risks/benefits/aftercare instructions. Provided with educational handout on DN. All questions and concerns answered and addressed.  Pt verbally consented to dry needling treatment session. Risks/benefits/aftercare instructions reviewed. All questions/concerns addressed.  Pt in prone position. Hand hygiene performed pre and post DN treatment session. Placement of needles in pathological tissue.   Ears, cervical and lumbar spine (needle location).  Total treatment duration to include set up/break down/in situ: 40 minutes. Patient was supervised by clinician throughout entirety of treatment session to include when DN in situ; clinician next to patient. All needles counted upon insertion and removal-- 14 needles. All accounted for and disposed in appropriate sharp container.     No adverse reaction to  "treatment. Pt advised may experience bruising, soreness, fatigue, bleeding from needle removal site. Pt verbalized understanding.         Plan  Patient would benefit from: PT Eval and Skilled PT  Planned modality interventions: Dry needling, Biofeedback, Cryotherapy, TENS, Thermotherapy: Hydrocollator Packs, and Traction  Planned therapy interventions: Abdominal trunk stabilization, ADL training, Balance, Balance/WB training, Breathing training, Body mechanics training, Dry Needling, Functional ROM exercises, Gait training, HEP, Joint mobilization, Manual therapy, Henry taping, Neuromuscular re-education, Patient education, Postural training, Strengthening, Stretching, Therapeutic activities, Therapeutic exercises, Therapeutic training, and Activity modification  Frequency: 2x/wk  Duration in weeks: 6  Plan of Care beginning date: 6/20/24  Plan of Care expiration date: 6 weeks - 8/1/2024  Treatment plan discussed with: Patient         Goals  Short Term Goals (4 weeks):    - Patient will be independent in basic HEP 2-3 weeks  - Patient will have 0/10 pain at rest  - Patient will demonstrate >1/3 improvement in MMT grade as applicable  - Patient functional goal: Patient will sit for >1 hour with no pain.     Long Term Goals (8 weeks):  - Patient will be independent in a comprehensive home exercise program  - Patient FOTO score will improve by 10 points.   - Patient will self-report >75% improvement in function  - Patient functional goal: Patient will drive >2 hours with no pain.       Subjective    History of Present Illness  - Mechanism of injury: Patient reports long standing history (15 years) of lumbar spine pain. She has increased pain around her tailbone, radiating out into the lateral aspect of her glutes. She reports her pain as burning. She has relief of symptoms when she is standing and walking. She takes gabapentin at night with CBD oil and is able to sleep \"like a baby.\" She has had several rounds of " "PT, with no help. She is to go on a trip to [place] next week.     - Functional limitations: sitting and driving >1 hour    - Patient goals: \"Sit for a while.\"       Pain  - Current pain ratin/10  - At best pain ratin/10  - At worst pain rating: 10/10  - Location: lumbar spine, B glutes   - Alleviating factors: gabapentin        Objective   LE MMT    L Hip Flexion: 5/5  R Hip Flexion: 5/5   L Hip Extension: 5/5 R Hip Extension: 5/5   L Hip Abduction: 5/5 R Hip Abduction: 5/5   L Hip Adduction: 5/5 R Hip Adduction: 5/5   L Knee Extension: /5 R Knee Extension: 5/5   L Knee Flexion: 5 R Knee Flexion: 5/5   L Ankle DF: 5 R Ankle DF: 5/5   L Ankle PF: 5  R Ankle PF: /5         Movement Loss Pradeep Mod Min Nil Symptoms   Flexion   x  no pain    Extension    x no pain    Side gliding R    x no pain    Side gliding L    x no pain    R rotation    x no pain    L rotation    x no pain       Symptom response Mechanical Response    During testing After testing Effect (?? ROM or key functional test) No effect   Pretest symptoms in standing       FIS       RFIS       EIS       YOVANI              Pretest symptoms lying       KELLY       RFIL       EIL       REIL              Pretest symptoms       R SGIS       R RSGIS       L SGIS       L RSGIS              Other movements             Sensation  - Light touch: intact     Palpation   -TTP B PSIS       Special Testing L R   FABIR negative negative   FADIR negative negative   Hip Scour  negative negative   Thigh thrust negative negative   Flick test  negative negative   Standing flexion test  negative negative   Prone knee flexion test negative negative   Supine to long sit test negative negative   Neurodynamic assessment  NT NT         Precautions: standard     History reviewed. No pertinent past medical history.    Insurance:  AMA/CMS Eval/ Re-eval POC expires FOTO Auth #/ Referral # Total   Visits  Start date  Expiration date Extension  Visit limitation?  PT only " or  PT+OT? Co-Insurance   CMS 6/20 8/1  No auth required     BOMN  PT $0 Co-pay                                                               Date 6/20/2024        Visit Number IE    FOTO             Manual         P-A mobs         Dry needling with estim 30'                           Neuro Re-ed         TrA progression         Hip add squeeze         Clamshells          Bridge          Sciatic nerve sliders                  Thera Ex         YOVANI/REIL         TB shoulder ext         TB shoulder row         Paloff press          Anti-rotation press                                    Thera Activity         Patient education 10' POC and HEP        Lifting mechanics         Posture education                                                      Modalities

## 2024-06-24 ENCOUNTER — OFFICE VISIT (OUTPATIENT)
Dept: PHYSICAL THERAPY | Facility: CLINIC | Age: 74
End: 2024-06-24
Payer: MEDICARE

## 2024-06-24 DIAGNOSIS — M54.16 LUMBAR RADICULOPATHY: Primary | ICD-10-CM

## 2024-06-24 PROCEDURE — 97140 MANUAL THERAPY 1/> REGIONS: CPT

## 2024-06-24 NOTE — PROGRESS NOTES
"Daily Note     Today's date: 2024  Patient name: Therese Iglesias  : 1950  MRN: [id number]  Referring provider: Self, Referral  Dx:   Encounter Diagnosis     ICD-10-CM    1. Lumbar radiculopathy  M54.16           Start Time: 1100  Stop Time: 1140  Total time in clinic (min): 40 minutes    Subjective: Patient states \"it didn't work\" and her pain returned when she went to [facility].       Objective: See treatment diary below      Assessment: Tolerated treatment well. Dry needling consent for reviewed and signed by patient. Pt educated on dry needling to include but not limited to: risks/benefits/aftercare instructions. Provided with educational handout on DN. All questions and concerns answered and addressed. Pt verbally consented to dry needling treatment session. Risks/benefits/aftercare instructions reviewed. All questions/concerns addressed.  Pt in prone position. Hand hygiene performed pre and post DN treatment session. Placement of needles in pathological tissue.   Ears, lumbar spine, L piriformis (needle location).  Total treatment duration to include set up/break down/in situ: 40 minutes. Patient was supervised by clinician throughout entirety of treatment session to include when DN in situ; clinician next to patient. All needles counted upon insertion and removal-- 22 needles. All accounted for and disposed in appropriate sharp container.     No adverse reaction to treatment. Pt advised may experience bruising, soreness, fatigue, bleeding from needle removal site. Pt verbalized understanding.    Patient would benefit from continued PT      Plan: Continue per plan of care.      Precautions: standard     History reviewed. No pertinent past medical history.    Insurance:  A/CMS Eval/ Re-eval POC expires FOTO Auth #/ Referral # Total   Visits  Start date  Expiration date Extension  Visit limitation?  PT only or  PT+OT? Co-Insurance   CMS   No auth required     BOMN  PT $0 Co-pay               "                                                 Date 6/20/2024 6/24/24       Visit Number IE 2   FOTO             Manual         P-A mobs         Dry needling with estim 30'  30'                         Neuro Re-ed         TrA progression         Hip add squeeze         Clamshells          Bridge          Sciatic nerve sliders                  Thera Ex         YOVANI/REIL         TB shoulder ext         TB shoulder row         Paloff press          Anti-rotation press                                    Thera Activity         Patient education 10' POC and HEP        Lifting mechanics         Posture education                                                      Modalities

## 2024-06-26 ENCOUNTER — OFFICE VISIT (OUTPATIENT)
Dept: FAMILY MEDICINE CLINIC | Facility: CLINIC | Age: 74
End: 2024-06-26
Payer: MEDICARE

## 2024-06-26 ENCOUNTER — OFFICE VISIT (OUTPATIENT)
Dept: PHYSICAL THERAPY | Facility: CLINIC | Age: 74
End: 2024-06-26
Payer: MEDICARE

## 2024-06-26 VITALS
OXYGEN SATURATION: 94 % | HEART RATE: 92 BPM | HEIGHT: 70 IN | RESPIRATION RATE: 18 BRPM | WEIGHT: 128 LBS | DIASTOLIC BLOOD PRESSURE: 70 MMHG | BODY MASS INDEX: 18.32 KG/M2 | TEMPERATURE: 97 F | SYSTOLIC BLOOD PRESSURE: 116 MMHG

## 2024-06-26 DIAGNOSIS — Z13.6 SCREENING FOR CARDIOVASCULAR CONDITION: ICD-10-CM

## 2024-06-26 DIAGNOSIS — Z12.11 COLON CANCER SCREENING: ICD-10-CM

## 2024-06-26 DIAGNOSIS — M54.50 CHRONIC MIDLINE LOW BACK PAIN WITHOUT SCIATICA: ICD-10-CM

## 2024-06-26 DIAGNOSIS — Z12.31 ENCOUNTER FOR SCREENING MAMMOGRAM FOR MALIGNANT NEOPLASM OF BREAST: ICD-10-CM

## 2024-06-26 DIAGNOSIS — Z13.29 SCREENING FOR THYROID DISORDER: ICD-10-CM

## 2024-06-26 DIAGNOSIS — M54.16 LUMBAR RADICULOPATHY: Primary | ICD-10-CM

## 2024-06-26 DIAGNOSIS — R53.83 OTHER FATIGUE: ICD-10-CM

## 2024-06-26 DIAGNOSIS — G89.29 CHRONIC MIDLINE LOW BACK PAIN WITHOUT SCIATICA: ICD-10-CM

## 2024-06-26 DIAGNOSIS — Z12.39 SCREENING BREAST EXAMINATION: ICD-10-CM

## 2024-06-26 DIAGNOSIS — E67.3 HYPERVITAMINOSIS D: ICD-10-CM

## 2024-06-26 DIAGNOSIS — Z00.00 MEDICARE ANNUAL WELLNESS VISIT, SUBSEQUENT: Primary | ICD-10-CM

## 2024-06-26 PROCEDURE — 97112 NEUROMUSCULAR REEDUCATION: CPT

## 2024-06-26 PROCEDURE — 97110 THERAPEUTIC EXERCISES: CPT

## 2024-06-26 PROCEDURE — G0439 PPPS, SUBSEQ VISIT: HCPCS | Performed by: INTERNAL MEDICINE

## 2024-06-26 RX ORDER — GABAPENTIN 300 MG/1
300 CAPSULE ORAL 2 TIMES DAILY
Start: 2024-06-26

## 2024-06-26 NOTE — PROGRESS NOTES
Ambulatory Visit  Name: Therese Iglesias      : 1950      MRN: [id number]  Encounter Provider: Karina Galvez MD  Encounter Date: 2024   Encounter department: [facility]    Assessment & Plan   1. Medicare annual wellness visit, subsequent  2. Chronic midline low back pain without sciatica  -     gabapentin (Neurontin) 300 mg capsule; Take 1 capsule (300 mg total) by mouth 2 (two) times a day  3. Screening breast examination  -     Mammo screening bilateral w 3d & cad; Future  4. Colon cancer screening  -     Cologuard  5. Encounter for screening mammogram for malignant neoplasm of breast  -     Mammo screening bilateral w 3d & cad; Future  6. Screening for cardiovascular condition  -     CBC; Future  -     Comprehensive metabolic panel; Future  7. Hypervitaminosis D  -     Vitamin D 25 hydroxy; Future  8. Screening for thyroid disorder  -     TSH, 3rd generation with Free T4 reflex; Future  9. Other fatigue  -     CBC; Future       Preventive health issues were discussed with patient, and age appropriate screening tests were ordered as noted in patient's After Visit Summary. Personalized health advice and appropriate referrals for health education or preventive services given if needed, as noted in patient's After Visit Summary.    History of Present Illness     Here for AWV.  Feels well.        Patient Care Team:  Karina Galvez MD as PCP - General (Internal Medicine)    Review of Systems   Constitutional:  Negative for chills and fever.   HENT:  Negative for ear pain and sore throat.    Eyes:  Negative for pain and visual disturbance.   Respiratory:  Negative for cough and shortness of breath.    Cardiovascular:  Negative for chest pain and palpitations.   Gastrointestinal:  Negative for abdominal pain and vomiting.   Genitourinary:  Negative for dysuria and hematuria.   Musculoskeletal:  Positive for back pain. Negative for arthralgias.   Skin:  Negative for color change and rash.    Neurological:  Negative for seizures and syncope.   All other systems reviewed and are negative.    Medical History Reviewed by provider this encounter:       Annual Wellness Visit Questionnaire   Therese is here for her Subsequent Wellness visit.     Health Risk Assessment:   Patient rates overall health as very good. Patient feels that their physical health rating is same. Patient is satisfied with their life. Eyesight was rated as same. Hearing was rated as same. Patient feels that their emotional and mental health rating is same. Patients states they are never, rarely angry. Patient states they are sometimes unusually tired/fatigued. Pain experienced in the last 7 days has been some. Patient's pain rating has been 4/10. Patient states that she has experienced no weight loss or gain in last 6 months.     Depression Screening:   PHQ-2 Score: 0      Fall Risk Screening:   In the past year, patient has experienced: no history of falling in past year      Urinary Incontinence Screening:   Patient has not leaked urine accidently in the last six months.     Home Safety:  Patient does not have trouble with stairs inside or outside of their home. Patient has working smoke alarms and has working carbon monoxide detector. Home safety hazards include: none.     Nutrition:   Current diet is Regular.     Medications:   Patient is not currently taking any over-the-counter supplements. Patient is able to manage medications.     Activities of Daily Living (ADLs)/Instrumental Activities of Daily Living (IADLs):   Walk and transfer into and out of bed and chair?: Yes  Dress and groom yourself?: Yes    Bathe or shower yourself?: Yes    Feed yourself? Yes  Do your laundry/housekeeping?: Yes  Manage your money, pay your bills and track your expenses?: Yes  Make your own meals?: Yes    Do your own shopping?: Yes    Previous Hospitalizations:   Any hospitalizations or ED visits within the last 12 months?: No      Advance Care  Planning:   Living will: Yes    Durable POA for healthcare: Yes    Advanced directive: Yes      PREVENTIVE SCREENINGS      Cardiovascular Screening:    General: Screening Current      Diabetes Screening:     General: Risks and Benefits Discussed    Due for: Blood Glucose      Colorectal Cancer Screening:     General: Screening Current      Breast Cancer Screening:     General: Risks and Benefits Discussed    Due for: Mammogram        Cervical Cancer Screening:    General: Screening Not Indicated      Osteoporosis Screening:    General: Risks and Benefits Discussed, Screening Current and Patient Declines      Abdominal Aortic Aneurysm (AAA) Screening:        General: Screening Not Indicated      Lung Cancer Screening:     General: Screening Not Indicated      Hepatitis C Screening:    General: Screening Current    Screening, Brief Intervention, and Referral to Treatment (SBIRT)    Screening  Typical number of drinks in a day: 0  Typical number of drinks in a week: 0  Interpretation: Low risk drinking behavior.    Single Item Drug Screening:  How often have you used an illegal drug (including marijuana) or a prescription medication for non-medical reasons in the past year? never    Single Item Drug Screen Score: 0  Interpretation: Negative screen for possible drug use disorder    Brief Intervention  Alcohol & drug use screenings were reviewed. No concerns regarding substance use disorder identified.     Other Counseling Topics:   Car/seat belt/driving safety, skin self-exam, sunscreen and calcium and vitamin D intake and regular weightbearing exercise.     Social Determinants of Health     Financial Resource Strain: Low Risk  (4/26/2023)    Overall Financial Resource Strain (CARDIA)    • Difficulty of Paying Living Expenses: Not hard at all   Transportation Needs: No Transportation Needs (4/26/2023)    PRAPARE - Transportation    • Lack of Transportation (Medical): No    • Lack of Transportation (Non-Medical): No  "    No results found.    Objective     /70   Pulse 92   Temp (!) 97 °F (36.1 °C)   Resp 18   Ht 5' 9.5\" (1.765 m)   Wt 58.1 kg (128 lb)   SpO2 94%   BMI 18.63 kg/m²     Physical Exam  Constitutional:       General: She is not in acute distress.     Appearance: She is well-developed. She is not diaphoretic.   HENT:      Head: Normocephalic and atraumatic.      Right Ear: External ear normal.      Left Ear: External ear normal.      Nose: Nose normal.      Mouth/Throat:      Mouth: Oropharynx is clear and moist.   Eyes:      Extraocular Movements: EOM normal.      Pupils: Pupils are equal, round, and reactive to light.   Neck:      Thyroid: No thyromegaly.      Vascular: No JVD.   Cardiovascular:      Rate and Rhythm: Regular rhythm.      Heart sounds: No murmur heard.     No friction rub. No gallop.   Pulmonary:      Effort: Pulmonary effort is normal.      Breath sounds: Normal breath sounds. No wheezing or rales.   Abdominal:      General: Bowel sounds are normal. There is no distension.      Palpations: Abdomen is soft.      Tenderness: There is no abdominal tenderness.   Musculoskeletal:         General: No edema. Normal range of motion.      Cervical back: Normal range of motion and neck supple.   Skin:     General: Skin is warm and dry.      Findings: No rash.   Neurological:      Mental Status: She is alert and oriented to person, place, and time.      Cranial Nerves: No cranial nerve deficit.      Sensory: No sensory deficit.      Motor: No abnormal muscle tone.      Coordination: Coordination normal.      Deep Tendon Reflexes: Reflexes normal.   Psychiatric:         Mood and Affect: Mood and affect normal.         Behavior: Behavior normal.         Thought Content: Thought content normal.         Judgment: Judgment normal.       Administrative Statements           "

## 2024-06-26 NOTE — PATIENT INSTRUCTIONS
Medicare Preventive Visit Patient Instructions  Thank you for completing your Welcome to Medicare Visit or Medicare Annual Wellness Visit today. Your next wellness visit will be due in one year (6/27/2025).  The screening/preventive services that you may require over the next 5-10 years are detailed below. Some tests may not apply to you based off risk factors and/or age. Screening tests ordered at today's visit but not completed yet may show as past due. Also, please note that scanned in results may not display below.  Preventive Screenings:  Service Recommendations Previous Testing/Comments   Colorectal Cancer Screening  * Colonoscopy    * Fecal Occult Blood Test (FOBT)/Fecal Immunochemical Test (FIT)  * Fecal DNA/Cologuard Test  * Flexible Sigmoidoscopy Age: 45-75 years old   Colonoscopy: every 10 years (may be performed more frequently if at higher risk)  OR  FOBT/FIT: every 1 year  OR  Cologuard: every 3 years  OR  Sigmoidoscopy: every 5 years  Screening may be recommended earlier than age 45 if at higher risk for colorectal cancer. Also, an individualized decision between you and your healthcare provider will decide whether screening between the ages of 76-85 would be appropriate. Colonoscopy: 03/20/2015  FOBT/FIT: Not on file  Cologuard: Not on file  Sigmoidoscopy: Not on file    Screening Current     Breast Cancer Screening Age: 40+ years old  Frequency: every 1-2 years  Not required if history of left and right mastectomy Mammogram: 03/23/2022        Cervical Cancer Screening Between the ages of 21-29, pap smear recommended once every 3 years.   Between the ages of 30-65, can perform pap smear with HPV co-testing every 5 years.   Recommendations may differ for women with a history of total hysterectomy, cervical cancer, or abnormal pap smears in past. Pap Smear: Not on file    Screening Not Indicated   Hepatitis C Screening Once for adults born between 1945 and 1965  More frequently in patients at high risk  for Hepatitis C Hep C Antibody: 05/06/2023    Screening Current   Diabetes Screening 1-2 times per year if you're at risk for diabetes or have pre-diabetes Fasting glucose: 79 mg/dL (5/6/2023)  A1C: No results in last 5 years (No results in last 5 years)      Cholesterol Screening Once every 5 years if you don't have a lipid disorder. May order more often based on risk factors. Lipid panel: 05/06/2023    Screening Current     Other Preventive Screenings Covered by Medicare:  Abdominal Aortic Aneurysm (AAA) Screening: covered once if your at risk. You're considered to be at risk if you have a family history of AAA.  Lung Cancer Screening: covers low dose CT scan once per year if you meet all of the following conditions: (1) Age 55-77; (2) No signs or symptoms of lung cancer; (3) Current smoker or have quit smoking within the last 15 years; (4) You have a tobacco smoking history of at least 20 pack years (packs per day multiplied by number of years you smoked); (5) You get a written order from a healthcare provider.  Glaucoma Screening: covered annually if you're considered high risk: (1) You have diabetes OR (2) Family history of glaucoma OR (3)  aged 50 and older OR (4)  American aged 65 and older  Osteoporosis Screening: covered every 2 years if you meet one of the following conditions: (1) You're estrogen deficient and at risk for osteoporosis based off medical history and other findings; (2) Have a vertebral abnormality; (3) On glucocorticoid therapy for more than 3 months; (4) Have primary hyperparathyroidism; (5) On osteoporosis medications and need to assess response to drug therapy.   Last bone density test (DXA Scan): 03/23/2022.  HIV Screening: covered annually if you're between the age of 15-65. Also covered annually if you are younger than 15 and older than 65 with risk factors for HIV infection. For pregnant patients, it is covered up to 3 times per  pregnancy.    Immunizations:  Immunization Recommendations   Influenza Vaccine Annual influenza vaccination during flu season is recommended for all persons aged >= 6 months who do not have contraindications   Pneumococcal Vaccine   * Pneumococcal conjugate vaccine = PCV13 (Prevnar 13), PCV15 (Vaxneuvance), PCV20 (Prevnar 20)  * Pneumococcal polysaccharide vaccine = PPSV23 (Pneumovax) Adults 19-63 yo with certain risk factors or if 65+ yo  If never received any pneumonia vaccine: recommend Prevnar 20 (PCV20)  Give PCV20 if previously received 1 dose of PCV13 or PPSV23   Hepatitis B Vaccine 3 dose series if at intermediate or high risk (ex: diabetes, end stage renal disease, liver disease)   Respiratory syncytial virus (RSV) Vaccine - COVERED BY MEDICARE PART D  * RSVPreF3 (Arexvy) CDC recommends that adults 60 years of age and older may receive a single dose of RSV vaccine using shared clinical decision-making (SCDM)   Tetanus (Td) Vaccine - COST NOT COVERED BY MEDICARE PART B Following completion of primary series, a booster dose should be given every 10 years to maintain immunity against tetanus. Td may also be given as tetanus wound prophylaxis.   Tdap Vaccine - COST NOT COVERED BY MEDICARE PART B Recommended at least once for all adults. For pregnant patients, recommended with each pregnancy.   Shingles Vaccine (Shingrix) - COST NOT COVERED BY MEDICARE PART B  2 shot series recommended in those 19 years and older who have or will have weakened immune systems or those 50 years and older     Health Maintenance Due:      Topic Date Due   • Colorectal Cancer Screening  03/20/2018   • Breast Cancer Screening: Mammogram  03/23/2023   • Hepatitis C Screening  Completed     Immunizations Due:      Topic Date Due   • Pneumococcal Vaccine: 65+ Years (1 of 1 - PCV) Never done   • COVID-19 Vaccine (1 - 2023-24 season) Never done   • Influenza Vaccine (Season Ended) 09/01/2024     Advance Directives   What are advance  directives?  Advance directives are legal documents that state your wishes and plans for medical care. These plans are made ahead of time in case you lose your ability to make decisions for yourself. Advance directives can apply to any medical decision, such as the treatments you want, and if you want to donate organs.   What are the types of advance directives?  There are many types of advance directives, and each state has rules about how to use them. You may choose a combination of any of the following:  Living will:  This is a written record of the treatment you want. You can also choose which treatments you do not want, which to limit, and which to stop at a certain time. This includes surgery, medicine, IV fluid, and tube feedings.   Durable power of  for healthcare (DPAHC):  This is a written record that states who you want to make healthcare choices for you when you are unable to make them for yourself. This person, called a proxy, is usually a family member or a friend. You may choose more than 1 proxy.  Do not resuscitate (DNR) order:  A DNR order is used in case your heart stops beating or you stop breathing. It is a request not to have certain forms of treatment, such as CPR. A DNR order may be included in other types of advance directives.  Medical directive:  This covers the care that you want if you are in a coma, near death, or unable to make decisions for yourself. You can list the treatments you want for each condition. Treatment may include pain medicine, surgery, blood transfusions, dialysis, IV or tube feedings, and a ventilator (breathing machine).  Values history:  This document has questions about your views, beliefs, and how you feel and think about life. This information can help others choose the care that you would choose.  Why are advance directives important?  An advance directive helps you control your care. Although spoken wishes may be used, it is better to have your wishes  written down. Spoken wishes can be misunderstood, or not followed. Treatments may be given even if you do not want them. An advance directive may make it easier for your family to make difficult choices about your care.       © Copyright [employer] 2018 Information is for End User's use only and may not be sold, redistributed or otherwise used for commercial purposes. All illustrations and images included in CareNotes® are the copyrighted property of A.D.A.M., Inc. or [employer]

## 2024-06-26 NOTE — PROGRESS NOTES
"Daily Note     Today's date: 2024  Patient name: Therese Iglesias  : 1950  MRN: [id number]  Referring provider: Self, Referral  Dx:   Encounter Diagnosis     ICD-10-CM    1. Lumbar radiculopathy  M54.16             Start Time: 1015  Stop Time: 1100  Total time in clinic (min): 45 minutes    Subjective: Patient states \"it didn't work\" and [facility]       Objective: See treatment diary below      Assessment: Tolerated treatment well. Dry needling consent for reviewed and signed by patient. Pt educated on dry needling to include but not limited to: risks/benefits/aftercare instructions. Provided with educational handout on DN. All questions and concerns answered and addressed. Pt verbally consented to dry needling treatment session. Risks/benefits/aftercare instructions reviewed. All questions/concerns addressed.  Pt in R SL position. Hand hygiene performed pre and post DN treatment session. Placement of needles in pathological tissue.   Lumbar spine, L piriformis (needle location).  Total treatment duration to include set up/break down/in situ: 40 minutes. Patient was supervised by clinician throughout entirety of treatment session to include when DN in situ; clinician next to patient. All needles counted upon insertion and removal-- 20 needles. All accounted for and disposed in appropriate sharp container.     No adverse reaction to treatment. Pt advised may experience bruising, soreness, fatigue, bleeding from needle removal site. Pt verbalized understanding.    Patient would benefit from continued PT      Plan: Continue per plan of care.      Precautions: standard     History reviewed. No pertinent past medical history.    Insurance:  A/CMS Eval/ Re-eval POC expires FOTO Auth #/ Referral # Total   Visits  Start date  Expiration date Extension  Visit limitation?  PT only or  PT+OT? Co-Insurance   CMS   No auth required     BOMN  PT $0 Co-pay                    "                                            Date 6/20/2024 6/24/24 6/26/24      Visit Number IE 2 3  FOTO             Manual         P-A mobs         Dry needling with estim 30'  30' 30'                        Neuro Re-ed         TrA progression         Hip add squeeze         Clamshells          Bridge          Sciatic nerve sliders                  Thera Ex         YOVANI/REIL         TB shoulder ext         TB shoulder row         Paloff press          Anti-rotation press                                    Thera Activity         Patient education 10' POC and HEP  10' POC and HEP      Lifting mechanics         Posture education                                                      Modalities

## 2024-07-02 ENCOUNTER — OFFICE VISIT (OUTPATIENT)
Dept: PHYSICAL THERAPY | Facility: CLINIC | Age: 74
End: 2024-07-02
Payer: MEDICARE

## 2024-07-02 DIAGNOSIS — M54.16 LUMBAR RADICULOPATHY: Primary | ICD-10-CM

## 2024-07-02 PROCEDURE — 97140 MANUAL THERAPY 1/> REGIONS: CPT

## 2024-07-02 NOTE — PROGRESS NOTES
Daily Note     Today's date: 2024  Patient name: Therese Iglesias  : 1950  MRN: [id number]  Referring provider: Self, Referral  Dx:   Encounter Diagnosis     ICD-10-CM    1. Lumbar radiculopathy  M54.16               Start Time: 1100  Stop Time: 1140  Total time in clinic (min): 40 minutes    Subjective: Patient had difficulties with sitting on her long trip to [place].      Objective: See treatment diary below      Assessment: Tolerated treatment well. Dry needling consent for reviewed and signed by patient. Pt educated on dry needling to include but not limited to: risks/benefits/aftercare instructions. Provided with educational handout on DN. All questions and concerns answered and addressed. Pt verbally consented to dry needling treatment session. Risks/benefits/aftercare instructions reviewed. All questions/concerns addressed.  Pt in R SL position. Hand hygiene performed pre and post DN treatment session. Placement of needles in pathological tissue.   Lumbar spine, L piriformis (needle location).  Total treatment duration to include set up/break down/in situ: 40 minutes. Patient was supervised by clinician throughout entirety of treatment session to include when DN in situ; clinician next to patient. All needles counted upon insertion and removal-- 18 needles. All accounted for and disposed in appropriate sharp container.     No adverse reaction to treatment. Pt advised may experience bruising, soreness, fatigue, bleeding from needle removal site. Pt verbalized understanding.    Patient would benefit from continued PT      Plan: Continue per plan of care.      Precautions: standard     History reviewed. No pertinent past medical history.    Insurance:  A/CMS Eval/ Re-eval POC expires FOTO Auth #/ Referral # Total   Visits  Start date  Expiration date Extension  Visit limitation?  PT only or  PT+OT? Co-Insurance   CMS   No auth required     BOMN  PT $0 Co-pay                                                                Date 6/20/2024 6/24/24 6/26/24 7/2/24     Visit Number IE 2 3 4 FOTO             Manual         P-A mobs         Dry needling with estim 30'  30' 30' 30'                       Neuro Re-ed         TrA progression         Hip add squeeze         Clamshells          Bridge          Sciatic nerve sliders                  Thera Ex         YOVANI/REIL         TB shoulder ext         TB shoulder row         Paloff press          Anti-rotation press                                    Thera Activity         Patient education 10' POC and HEP  10' POC and HEP      Lifting mechanics         Posture education                                                      Modalities

## 2024-07-08 ENCOUNTER — APPOINTMENT (OUTPATIENT)
Dept: LAB | Facility: HOSPITAL | Age: 74
End: 2024-07-08
Payer: MEDICARE

## 2024-07-08 DIAGNOSIS — Z13.6 SCREENING FOR CARDIOVASCULAR CONDITION: ICD-10-CM

## 2024-07-08 DIAGNOSIS — E67.3 HYPERVITAMINOSIS D: ICD-10-CM

## 2024-07-08 DIAGNOSIS — Z13.29 SCREENING FOR THYROID DISORDER: ICD-10-CM

## 2024-07-08 DIAGNOSIS — M79.10 MYALGIA: ICD-10-CM

## 2024-07-08 DIAGNOSIS — R53.83 OTHER FATIGUE: ICD-10-CM

## 2024-07-08 LAB
25(OH)D3 SERPL-MCNC: 92.7 NG/ML (ref 30–100)
ALBUMIN SERPL BCG-MCNC: 4.4 G/DL (ref 3.5–5)
ALP SERPL-CCNC: 64 U/L (ref 34–104)
ALT SERPL W P-5'-P-CCNC: 21 U/L (ref 7–52)
ANION GAP SERPL CALCULATED.3IONS-SCNC: 6 MMOL/L (ref 4–13)
AST SERPL W P-5'-P-CCNC: 26 U/L (ref 13–39)
B BURGDOR IGG+IGM SER QL IA: NEGATIVE
BILIRUB SERPL-MCNC: 0.83 MG/DL (ref 0.2–1)
BUN SERPL-MCNC: 24 MG/DL (ref 5–25)
CALCIUM SERPL-MCNC: 9.4 MG/DL (ref 8.4–10.2)
CHLORIDE SERPL-SCNC: 102 MMOL/L (ref 96–108)
CO2 SERPL-SCNC: 30 MMOL/L (ref 21–32)
CREAT SERPL-MCNC: 0.94 MG/DL (ref 0.6–1.3)
ERYTHROCYTE [DISTWIDTH] IN BLOOD BY AUTOMATED COUNT: 13.1 % (ref 11.6–15.1)
GFR SERPL CREATININE-BSD FRML MDRD: 59 ML/MIN/1.73SQ M
GLUCOSE P FAST SERPL-MCNC: 88 MG/DL (ref 65–99)
HCT VFR BLD AUTO: 42.7 % (ref 34.8–46.1)
HGB BLD-MCNC: 13.9 G/DL (ref 11.5–15.4)
MCH RBC QN AUTO: 32.1 PG (ref 26.8–34.3)
MCHC RBC AUTO-ENTMCNC: 32.6 G/DL (ref 31.4–37.4)
MCV RBC AUTO: 99 FL (ref 82–98)
PLATELET # BLD AUTO: 210 THOUSANDS/UL (ref 149–390)
PMV BLD AUTO: 10 FL (ref 8.9–12.7)
POTASSIUM SERPL-SCNC: 4.5 MMOL/L (ref 3.5–5.3)
PROT SERPL-MCNC: 7.2 G/DL (ref 6.4–8.4)
RBC # BLD AUTO: 4.33 MILLION/UL (ref 3.81–5.12)
SODIUM SERPL-SCNC: 138 MMOL/L (ref 135–147)
TSH SERPL DL<=0.05 MIU/L-ACNC: 3.21 UIU/ML (ref 0.45–4.5)
WBC # BLD AUTO: 4.23 THOUSAND/UL (ref 4.31–10.16)

## 2024-07-08 PROCEDURE — 84443 ASSAY THYROID STIM HORMONE: CPT

## 2024-07-08 PROCEDURE — 80053 COMPREHEN METABOLIC PANEL: CPT

## 2024-07-08 PROCEDURE — 85027 COMPLETE CBC AUTOMATED: CPT

## 2024-07-08 PROCEDURE — 36415 COLL VENOUS BLD VENIPUNCTURE: CPT

## 2024-07-08 PROCEDURE — 82306 VITAMIN D 25 HYDROXY: CPT

## 2024-07-08 PROCEDURE — 86618 LYME DISEASE ANTIBODY: CPT

## 2024-07-09 ENCOUNTER — OFFICE VISIT (OUTPATIENT)
Dept: PHYSICAL THERAPY | Facility: CLINIC | Age: 74
End: 2024-07-09
Payer: MEDICARE

## 2024-07-09 DIAGNOSIS — M54.16 LUMBAR RADICULOPATHY: Primary | ICD-10-CM

## 2024-07-09 PROCEDURE — 97012 MECHANICAL TRACTION THERAPY: CPT

## 2024-07-09 PROCEDURE — 97530 THERAPEUTIC ACTIVITIES: CPT

## 2024-07-09 PROCEDURE — 97110 THERAPEUTIC EXERCISES: CPT

## 2024-07-09 NOTE — PROGRESS NOTES
Daily Note     Today's date: 2024  Patient name: Therese Iglesias  : 1950  MRN: [id number]  Referring provider: Self, Referral  Dx:   Encounter Diagnosis     ICD-10-CM    1. Lumbar radiculopathy  M54.16           Start Time: 1015  Stop Time: 1050  Total time in clinic (min): 35 minutes    Subjective: Patient reports no major changes since last visit, continues to have familiar pain with prolonged sitting.      Objective: See treatment diary below      Assessment: Tolerated treatment well. Focus of today's session on assessing possible pelvic floor musculature involvement in patients symptoms. Patient denies any bowel or  bladder changes, mild straining, denies any dyspareunia or intercourse related issues. Patient notes that she is getting over Lyme's disease , and is prone to getting [place]. It is unclear to this PT how much of this is related to this chronic issue. No history of any abdominal or gynecological surgery.     With pelvic floor based exercises- patient does not seem to have any pelvic floor muscle involvement. Coccyx and sacral mobility is incredibly hypomobile. TTP point does resemble path of obturator internus, and so patient may benefit from pelvic floor evaluation. Scheduled for tomorrow. Will continue to evaluate.     Plan: Continue per plan of care.      Precautions: standard     History reviewed. No pertinent past medical history.    Insurance:  AMA/CMS Eval/ Re-eval POC expires FOTO Auth #/ Referral # Total   Visits  Start date  Expiration date Extension  Visit limitation?  PT only or  PT+OT? Co-Insurance   CMS   No auth required     BOMN  PT $0 Co-pay                                                               Date 2024    Visit Number IE 2 3 4 FOTO             Manual         P-A mobs         Dry needling with estim 30'  30' 30' 30'          Traction              Neuro Re-ed         TrA progression         Hip add squeeze          Lesia Arciniega          Sciatic nerve sliders                  Thera Ex         YOVANI/REIL         TB shoulder ext         TB shoulder row         Paloff press          Anti-rotation press              Pelvic floor stretching:  Orlin pose, cat/cow, happy baby, butterfly, bound angle                      Thera Activity         Patient education 10' POC and HEP  10' POC and HEP  Pelvic floor education and intro to OI anatomy    Lifting mechanics         Posture education                                                      Modalities

## 2024-07-10 ENCOUNTER — EVALUATION (OUTPATIENT)
Dept: PHYSICAL THERAPY | Facility: CLINIC | Age: 74
End: 2024-07-10
Payer: MEDICARE

## 2024-07-10 DIAGNOSIS — M54.16 LUMBAR RADICULOPATHY: Primary | ICD-10-CM

## 2024-07-10 PROCEDURE — 97162 PT EVAL MOD COMPLEX 30 MIN: CPT

## 2024-07-10 PROCEDURE — 97530 THERAPEUTIC ACTIVITIES: CPT

## 2024-07-10 NOTE — PROGRESS NOTES
PT Evaluation     Today's date: 7/10/2024  Patient name: Therese Iglesias  : 1950  MRN: [id number]  Referring provider: Self, Referral  Dx:   Encounter Diagnosis     ICD-10-CM    1. Lumbar radiculopathy  M54.16           Assessment  Impairments: abnormal coordination, abnormal muscle firing, abnormal or restricted ROM, activity intolerance, lacks appropriate home exercise program, poor posture  and poor body mechanics  Symptom irritability: moderate    Assessment details: Therese Iglesias is a 73 y.o. female  who presents to outpatient pelvic floor physical therapy with the following complaints: bilateral glute pain proximally. Patient's presentation is consistent with OI dysfunction, supported by findings from the examination including: tenderness to palpation of bilateral OI. This patient is functionally limited in prolonged sitting on any surface. Therese Iglesias demonstrates good rehab potential and would benefit from skilled pelvic floor physical therapy to address the above complaints and functional limitations.    Understanding of Dx/Px/POC: good     Prognosis: good    Goals  STG to be completed in 4 weeks:  Patient will demonstrate proper sequencing of DB and PFMC  Patient will report 25% or better improvement in symptoms  Patient will be independent in pelvic floor stretching exercises.      LTG to be completed in 6 weeks ()  Patient will demonstrate independence with comprehensive home exercise program.  Patient will report 75% or better improvement in symptoms  Patient will report no pain with prolonged sitting.      Plan  Patient would benefit from: skilled physical therapy  Planned modality interventions: biofeedback, ultrasound, electrical stimulation/Russian stimulation, cryotherapy and thermotherapy: hydrocollator packs    Planned therapy interventions: abdominal trunk stabilization, activity modification, ADL retraining, balance/weight bearing training, behavior modification, body mechanics  training, breathing training, massage, manual therapy, joint mobilization, Henry taping, nerve gliding, neuromuscular re-education, patient education, postural training, strengthening, stretching, therapeutic activities, functional ROM exercises, flexibility, fine motor coordination training, home exercise program and therapeutic exercise    Frequency: 1x week  Duration in weeks: 6  Plan of Care beginning date: 7/10/2024  Plan of Care expiration date: 2024  Treatment plan discussed with: patient        PT Pelvic Floor Subjective:   History of Present Illness:   Patient presents with a history of bilateral lower glute/very proximal HS origin pain that only occurs with prolonged sitting (can be instantaneous or after 10 minutes of sitting). Is very active physically. Notes that the only sitting surface that doesn't bother her is the toilet seat.    Urinary habits: can go 2-4 interval for voiding. Able to start stream of urine, and characterizes it as a strong stream. 1-3 nocturia. Denies feelings of incomplete voiding. Denies pain with urination. Endorses urinary incontinence when performing the following actions: laughing, coughing, sneezing (but notes that this happens with a full bladder), and with strong urge (once in a blue moon). Denies use of pads/diapers.     Fluid intake: Water: 80 oz, will drink up until bedtime, 2 mugs coffee in AM    Activity level: embroidery, walking, gardening    Bowel habits: 1 BM/day. Endorses incomplete evacuation about 50% of the time. Denies straining, constipation, pain with defecation. Reports type 4 stool using the BSS.     GYN habits: Denies seeing GYN regularly as of 4 years ago. Patient is  with both vaginal deliveries. Endorses hemorrhoids. Denies cysts, fibroids, endometriosis. Menopausal at age 54. Denies history of UTIs/STIs. Is currently sexually active. Denies pain with sexual activity.     MSK system history: (hip, back, pelvic pain)  - History of surgery:  10+ years of this pain      Objective     General Comments:      Lumbar Comments  Patient is incredibly hypomobile throughout the lumbar spine into the sacrum and coccyx. There is no coccyx mobility externally. Tenderness to palpation is around the same level as the path of obturator internus.            Precautions: standard    Insurance:  AMA/CMS Eval/ Re-eval POC expires FOTO Auth #/ Referral # Total    Start date  Expiration date Extension  Visit limitation?  PT only or  PT+OT? Co-Insurance   cms eval                                                                         AUTH #:  Date               Authed: Used                Remaining                     7/10/24        VISIT 1-IE        Manuals         PFM exam         Abdominal mobilizations         Colonic massage         Scar mobilization         Neuro Re-Ed         Neural reset         PFMC slow holds         360 breathing         DB         DB+PFMC         TA+PFMC         Bridges         Clamshells         Add squeeze         TA+march         Biofeedback                  Ther Ex         Sit to stand +PFMC         Lateral walks with band         Rows/Ext         SLR         Squats         Lunges         Walking         Paloff press         QPED- LE ext          - fire hydrant         - thread the needle         -cat/cow                  Orlin pose         Happy baby         Butterfly         Deep squat         SKTC/DKTC         LTR         Feet on wall                  Ther Activity         Voiding diary         Knack         Fiber intake education         Bowel habits education         Bladder irritants education         Toileting posture         Urge deferral strategies         Dilator/wand education         Increasing water intake education         Mindfulness education                   Access Code: [email or web address]  URL: [email or web address]  Date: 07/10/2024  Prepared by: Alka Bueno    Exercises  - Supine Bridge with Resistance Band  - 1  x daily - 7 x weekly - 3 sets - 10 reps  - Supine March  - 1 x daily - 7 x weekly - 3 sets - 10 reps  - Bridge with Arms at Sides and Feet on Swiss Ball  - 1 x daily - 7 x weekly - 3 sets - 10 reps  - Hooklying Clamshell with Resistance  - 1 x daily - 7 x weekly - 3 sets - 10 reps  - Seated Isometric Hip Adduction with Pelvic Floor Contraction  - 1 x daily - 7 x weekly - 3 sets - 10 reps  - Supine Pelvic Tilt with Straight Leg Raise  - 1 x daily - 7 x weekly - 3 sets - 10 reps  - Sit to Stand with Pelvic Floor Contraction  - 1 x daily - 7 x weekly - 3 sets - 10 reps  - Step Up with Pelvic Floor Contraction  - 1 x daily - 7 x weekly - 3 sets - 10 reps  - Mini Squat with Pelvic Floor Contraction  - 1 x daily - 7 x weekly - 3 sets - 10 reps  - Push Up at Wall with Pelvic Floor Contraction  - 1 x daily - 7 x weekly - 3 sets - 10 reps  - Diaphragmatic Breathing in Child's Pose with Pelvic Floor Relaxation  - 1 x daily - 7 x weekly - 3 sets - 10 reps  - Supine Butterfly Groin Stretch  - 1 x daily - 7 x weekly - 3 sets - 10 reps  - Supported Butterfly Stretch with Pelvic Floor Relaxation  - 1 x daily - 7 x weekly - 3 sets - 10 reps  - Butterfly Groin Stretch  - 1 x daily - 7 x weekly - 3 sets - 10 reps  - Bound Angle with Thigh Press and Side Bend  - 1 x daily - 7 x weekly - 3 sets - 10 reps

## 2024-07-11 ENCOUNTER — OFFICE VISIT (OUTPATIENT)
Dept: PHYSICAL THERAPY | Facility: CLINIC | Age: 74
End: 2024-07-11
Payer: MEDICARE

## 2024-07-11 DIAGNOSIS — M54.16 LUMBAR RADICULOPATHY: Primary | ICD-10-CM

## 2024-07-11 PROCEDURE — 97140 MANUAL THERAPY 1/> REGIONS: CPT

## 2024-07-11 PROCEDURE — 97530 THERAPEUTIC ACTIVITIES: CPT

## 2024-07-11 NOTE — PROGRESS NOTES
Daily Note     Today's date: 2024  Patient name: Therese Iglesias  : 1950  MRN: [id number]  Referring provider: Self, Referral  Dx:   Encounter Diagnosis     ICD-10-CM    1. Lumbar radiculopathy  M54.16             Start Time: 1330  Stop Time: 1415  Total time in clinic (min): 45 minutes    Subjective: Patient had pelvic floor exam performed yesterday and believes her obturator internus is involved in her pain.       Objective: See treatment diary below      Assessment: Tolerated treatment well. Dry needling consent for reviewed and signed by patient. Pt educated on dry needling to include but not limited to: risks/benefits/aftercare instructions. Provided with educational handout on DN. All questions and concerns answered and addressed.  Pt verbally consented to dry needling treatment session. Risks/benefits/aftercare instructions reviewed. All questions/concerns addressed.  Pt in R SL position. Hand hygiene performed pre and post DN treatment session. Placement of needles in pathological tissue.   Lumbar spine, sacrum, piriformis B, obturator internus (needle location).  Total treatment duration to include set up/break down/in situ: 40 minutes. Patient was supervised by clinician throughout entirety of treatment session to include when DN in situ; clinician next to patient. All needles counted upon insertion and removal-- 30 needles. All accounted for and disposed in appropriate sharp container.     No adverse reaction to treatment. Pt advised may experience bruising, soreness, fatigue. Pt verbalized understanding.         Plan: Continue per plan of care.      Precautions: standard     History reviewed. No pertinent past medical history.    Insurance:  A/CMS Eval/ Re-eval POC expires FOTO Auth #/ Referral # Total   Visits  Start date  Expiration date Extension  Visit limitation?  PT only or  PT+OT? Co-Insurance   CMS   No auth required     BOMN  PT $0 Co-pay                                                                Date 6/20/2024 6/24/24 6/26/24 7/2/24 7/9/24 7/11/24   Visit Number IE 2 3 4 FOTO 6            Manual         P-A mobs         Dry needling with estim 30'  30' 30' 30'  30'         Traction              Neuro Re-ed         TrA progression         Hip add squeeze         Clamshells          Bridge          Sciatic nerve sliders                  Thera Ex         YOVANI/REIL         TB shoulder ext         TB shoulder row         Paloff press          Anti-rotation press              Pelvic floor stretching:  Orlin pose, cat/cow, happy baby, butterfly, bound angle                      Thera Activity         Patient education 10' POC and HEP  10' POC and HEP  Pelvic floor education and intro to OI anatomy 15' POC   Lifting mechanics         Posture education                                                      Modalities

## 2024-07-15 ENCOUNTER — OFFICE VISIT (OUTPATIENT)
Dept: PHYSICAL THERAPY | Facility: CLINIC | Age: 74
End: 2024-07-15
Payer: MEDICARE

## 2024-07-15 DIAGNOSIS — M54.16 LUMBAR RADICULOPATHY: Primary | ICD-10-CM

## 2024-07-15 PROCEDURE — 97140 MANUAL THERAPY 1/> REGIONS: CPT

## 2024-07-15 NOTE — PROGRESS NOTES
Daily Note     Today's date: 7/15/2024  Patient name: Therese Iglesias  : 1950  MRN: [id number]  Referring provider: Self, Referral  Dx:   Encounter Diagnosis     ICD-10-CM    1. Lumbar radiculopathy  M54.16           Start Time: 1100  Stop Time: 1150  Total time in clinic (min): 50 minutes    Subjective: Reports pain only when sitting or driving over an hour      Objective: See treatment diary below      Assessment: Tolerated treatment well. Patient would benefit from continued PT in order to down train central nervous system. Dry needling with focus on obturator externus/ internus in conjunction with diaphragmatic breathing.  Pt verbally consented to dry needling treatment session. Risks/benefits/aftercare instructions reviewed. All questions/concerns addressed.  Pt in supine position. Hand hygiene performed pre and post DN treatment session. Placement of needles in pathological tissue.   4 ears, 16 b/l glutes, 4 adductors (needle location).  Total treatment duration to include set up/break down/in situ: 45 minutes. Patient was supervised by clinician throughout entirety of treatment session to include when DN in situ; clinician next to patient. All needles counted upon insertion and removal-- 24 needles. All accounted for and disposed in appropriate sharp container.     No adverse reaction to treatment. Pt advised may experience muscular fatigue and soreness. Pt verbalized understanding.          Plan: Continue per plan of care.      Precautions: standard     History reviewed. No pertinent past medical history.    Insurance:  A/CMS Eval/ Re-eval POC expires FOTO Auth #/ Referral # Total   Visits  Start date  Expiration date Extension  Visit limitation?  PT only or  PT+OT? Co-Insurance   CMS   No auth required     BOMN  PT $0 Co-pay                                                               Date 2024 6/24/24 6/26/24 7/2/24 7/9/24 7/11/24 7/15/247   Visit Number IE 2 3 4 FOTO 6               Manual          P-A mobs          Dry needling with estim 30'  30' 30' 30'  30'  35'        Traction                Neuro Re-ed       Diaphragmatic breathing   TrA progression          Hip add squeeze          Clamshells           Bridge           Sciatic nerve sliders                    Thera Ex          YOVANI/REIL          TB shoulder ext          TB shoulder row          Paloff press           Anti-rotation press               Pelvic floor stretching:  Orlin pose, cat/cow, happy baby, butterfly, bound angle                         Thera Activity          Patient education 10' POC and HEP  10' POC and HEP  Pelvic floor education and intro to OI anatomy 15' POC    Lifting mechanics          Posture education                                                            Modalities

## 2024-07-16 NOTE — PROGRESS NOTES
Daily Note     Today's date: 2024  Patient name: Therese Iglesias  : 1950  MRN: [id number]  Referring provider: Self, Referral  Dx:   Encounter Diagnosis     ICD-10-CM    1. Lumbar radiculopathy  M54.16           Start Time: 1100  Stop Time: 1145  Total time in clinic (min): 45 minutes    Subjective: Reports no change since last in session but would like to give dry needling more of a chance      Objective: See treatment diary below      Assessment: Tolerated treatment well. Patient would benefit from continued PT in order to down train central nervous system. Dry needling with focus on obturator externus/ internus in conjunction with diaphragmatic breathing.  Pt verbally consented to dry needling treatment session. Risks/benefits/aftercare instructions reviewed. All questions/concerns addressed.  Pt in supine position. Hand hygiene performed pre and post DN treatment session. Placement of needles in pathological tissue.   4 ears, 16 b/l glutes, 8 greater trochanter (needle location).  Total treatment duration to include set up/break down/in situ: 45 minutes. Patient was supervised by clinician throughout entirety of treatment session to include when DN in situ; clinician next to patient. All needles counted upon insertion and removal-- 28 needles. All accounted for and disposed in appropriate sharp container.      No adverse reaction to treatment. Pt advised may experience muscular fatigue and soreness. Pt verbalized understanding.       Plan: Continue per plan of care.      Precautions: standard     History reviewed. No pertinent past medical history.    Insurance:  AMA/CMS Eval/ Re-eval POC expires FOTO Auth #/ Referral # Total   Visits  Start date  Expiration date Extension  Visit limitation?  PT only or  PT+OT? Co-Insurance   CMS   No auth required     BOMN  PT $0 Co-pay                                                               Date 2024 6/24/24 6/26/24 7/2/24 7/9/24 7/11/24 7/15/247  7/17/24   Visit Number IE 2 3 4 FOTO 6  8              Manual           P-A mobs           Dry needling with estim 30'  30' 30' 30'  30'  35' 35'        Traction                  Neuro Re-ed       Diaphragmatic breathing Diaphragmatic breathing   TrA progression           Hip add squeeze           Clamshells            Bridge            Sciatic nerve sliders                      Thera Ex           YOVANI/REIL           TB shoulder ext           TB shoulder row           Paloff press            Anti-rotation press                Pelvic floor stretching:  Orlin pose, cat/cow, happy baby, butterfly, bound angle                            Thera Activity           Patient education 10' POC and HEP  10' POC and HEP  Pelvic floor education and intro to OI anatomy 15' POC     Lifting mechanics           Posture education                                                                  Modalities

## 2024-07-17 ENCOUNTER — OFFICE VISIT (OUTPATIENT)
Dept: PHYSICAL THERAPY | Facility: CLINIC | Age: 74
End: 2024-07-17
Payer: MEDICARE

## 2024-07-17 DIAGNOSIS — M54.16 LUMBAR RADICULOPATHY: Primary | ICD-10-CM

## 2024-07-17 PROCEDURE — 97530 THERAPEUTIC ACTIVITIES: CPT

## 2024-07-17 PROCEDURE — 97140 MANUAL THERAPY 1/> REGIONS: CPT

## 2024-07-20 LAB — COLOGUARD RESULT REPORTABLE: NEGATIVE

## 2024-07-22 ENCOUNTER — OFFICE VISIT (OUTPATIENT)
Dept: PHYSICAL THERAPY | Facility: CLINIC | Age: 74
End: 2024-07-22
Payer: MEDICARE

## 2024-07-22 DIAGNOSIS — M54.16 LUMBAR RADICULOPATHY: Primary | ICD-10-CM

## 2024-07-22 PROCEDURE — 97140 MANUAL THERAPY 1/> REGIONS: CPT

## 2024-07-22 NOTE — PROGRESS NOTES
Daily Note     Today's date: 2024  Patient name: Therese Iglesias  : 1950  MRN: [id number]  Referring provider: Self, Referral  Dx:   Encounter Diagnosis     ICD-10-CM    1. Lumbar radiculopathy  M54.16           Start Time: 1545  Stop Time: 1630  Total time in clinic (min): 45 minutes    Subjective: No change since last in clinic      Objective: See treatment diary below      Assessment: Tolerated treatment fair. Patient would benefit from continued PT in order to decrease lumbar sxs. Discussed possibility of going to pain management for a injection into SIJ if she did not respond to this treatment session of dry needling. Pt verbally consented to dry needling treatment session. Risks/benefits/aftercare instructions reviewed. All questions/concerns addressed.  Pt in right side lying  position. Hand hygiene performed pre and post DN treatment session. Placement of needles in pathological tissue.   6 sacrum, 8 coccyx, 4 greater trochanter, 14 glutes, piriformis (needle location).  Total treatment duration to include set up/break down/in situ: 40 minutes. Patient was supervised by clinician throughout entirety of treatment session to include when DN in situ; clinician next to patient. All needles counted upon insertion and removal-- 32 needles. All accounted for and disposed in appropriate sharp container.     No adverse reaction to treatment. Pt advised may experience muscular pain and soreness. Pt verbalized understanding.          Plan: Continue per plan of care.      Precautions: standard     History reviewed. No pertinent past medical history.    Insurance:  AMA/CMS Eval/ Re-eval POC expires FOTO Auth #/ Referral # Total   Visits  Start date  Expiration date Extension  Visit limitation?  PT only or  PT+OT? Co-Insurance   CMS   No auth required     BOMN  PT $0 Co-pay                                                               Date 2024 6/24/24 6/26/24 7/2/24 7/9/24 7/11/24 7/15/247  7/17/24 7/22/24   Visit Number IE 2 3 4 FOTO 6  8 9               Manual            P-A mobs            Dry needling with estim 30'  30' 30' 30'  30'  35' 35' 40'        Traction                    Neuro Re-ed       Diaphragmatic breathing Diaphragmatic breathing    TrA progression            Hip add squeeze            Clamshells             Bridge             Sciatic nerve sliders                        Thera Ex            YOVANI/REIL            TB shoulder ext            TB shoulder row            Paloff press             Anti-rotation press                 Pelvic floor stretching:  Orlin pose, cat/cow, happy baby, butterfly, bound angle                               Thera Activity            Patient education 10' POC and HEP  10' POC and HEP  Pelvic floor education and intro to OI anatomy 15' POC      Lifting mechanics            Posture education                                                                        Modalities

## 2024-08-08 ENCOUNTER — APPOINTMENT (OUTPATIENT)
Dept: LAB | Facility: HOSPITAL | Age: 74
End: 2024-08-08
Attending: FAMILY MEDICINE
Payer: MEDICARE

## 2024-08-08 ENCOUNTER — OFFICE VISIT (OUTPATIENT)
Dept: FAMILY MEDICINE CLINIC | Facility: CLINIC | Age: 74
End: 2024-08-08
Payer: MEDICARE

## 2024-08-08 ENCOUNTER — HOSPITAL ENCOUNTER (OUTPATIENT)
Dept: RADIOLOGY | Facility: HOSPITAL | Age: 74
Discharge: HOME | End: 2024-08-08
Payer: MEDICARE

## 2024-08-08 VITALS
HEART RATE: 87 BPM | WEIGHT: 130 LBS | DIASTOLIC BLOOD PRESSURE: 82 MMHG | TEMPERATURE: 97.1 F | RESPIRATION RATE: 18 BRPM | BODY MASS INDEX: 18.61 KG/M2 | SYSTOLIC BLOOD PRESSURE: 126 MMHG | HEIGHT: 70 IN

## 2024-08-08 DIAGNOSIS — R53.83 OTHER FATIGUE: ICD-10-CM

## 2024-08-08 DIAGNOSIS — G89.29 CHRONIC MIDLINE LOW BACK PAIN WITHOUT SCIATICA: Primary | ICD-10-CM

## 2024-08-08 DIAGNOSIS — G89.29 CHRONIC MIDLINE LOW BACK PAIN WITHOUT SCIATICA: ICD-10-CM

## 2024-08-08 DIAGNOSIS — M79.10 MYALGIA: ICD-10-CM

## 2024-08-08 DIAGNOSIS — M54.50 CHRONIC MIDLINE LOW BACK PAIN WITHOUT SCIATICA: Primary | ICD-10-CM

## 2024-08-08 DIAGNOSIS — M54.50 CHRONIC MIDLINE LOW BACK PAIN WITHOUT SCIATICA: ICD-10-CM

## 2024-08-08 DIAGNOSIS — R26.89 OTHER ABNORMALITIES OF GAIT AND MOBILITY: ICD-10-CM

## 2024-08-08 LAB
BASOPHILS # BLD AUTO: 0.03 THOUSANDS/ÂΜL (ref 0–0.1)
BASOPHILS NFR BLD AUTO: 1 % (ref 0–1)
EOSINOPHIL # BLD AUTO: 0.1 THOUSAND/ÂΜL (ref 0–0.61)
EOSINOPHIL NFR BLD AUTO: 2 % (ref 0–6)
ERYTHROCYTE [DISTWIDTH] IN BLOOD BY AUTOMATED COUNT: 12.9 % (ref 11.6–15.1)
HCT VFR BLD AUTO: 43.1 % (ref 34.8–46.1)
HGB BLD-MCNC: 14.3 G/DL (ref 11.5–15.4)
IMM GRANULOCYTES # BLD AUTO: 0.01 THOUSAND/UL (ref 0–0.2)
IMM GRANULOCYTES NFR BLD AUTO: 0 % (ref 0–2)
LYMPHOCYTES # BLD AUTO: 2.1 THOUSANDS/ÂΜL (ref 0.6–4.47)
LYMPHOCYTES NFR BLD AUTO: 33 % (ref 14–44)
MCH RBC QN AUTO: 32.5 PG (ref 26.8–34.3)
MCHC RBC AUTO-ENTMCNC: 33.2 G/DL (ref 31.4–37.4)
MCV RBC AUTO: 98 FL (ref 82–98)
MONOCYTES # BLD AUTO: 0.5 THOUSAND/ÂΜL (ref 0.17–1.22)
MONOCYTES NFR BLD AUTO: 8 % (ref 4–12)
NEUTROPHILS # BLD AUTO: 3.7 THOUSANDS/ÂΜL (ref 1.85–7.62)
NEUTS SEG NFR BLD AUTO: 56 % (ref 43–75)
NRBC BLD AUTO-RTO: 0 /100 WBCS
PLATELET # BLD AUTO: 249 THOUSANDS/UL (ref 149–390)
PMV BLD AUTO: 10.3 FL (ref 8.9–12.7)
RBC # BLD AUTO: 4.4 MILLION/UL (ref 3.81–5.12)
VIT B12 SERPL-MCNC: 1005 PG/ML (ref 180–914)
WBC # BLD AUTO: 6.44 THOUSAND/UL (ref 4.31–10.16)

## 2024-08-08 PROCEDURE — 72100 X-RAY EXAM L-S SPINE 2/3 VWS: CPT

## 2024-08-08 PROCEDURE — 36415 COLL VENOUS BLD VENIPUNCTURE: CPT

## 2024-08-08 PROCEDURE — 82607 VITAMIN B-12: CPT

## 2024-08-08 PROCEDURE — 86665 EPSTEIN-BARR CAPSID VCA: CPT

## 2024-08-08 PROCEDURE — G2211 COMPLEX E/M VISIT ADD ON: HCPCS | Performed by: FAMILY MEDICINE

## 2024-08-08 PROCEDURE — 86038 ANTINUCLEAR ANTIBODIES: CPT

## 2024-08-08 PROCEDURE — 86663 EPSTEIN-BARR ANTIBODY: CPT

## 2024-08-08 PROCEDURE — 86664 EPSTEIN-BARR NUCLEAR ANTIGEN: CPT

## 2024-08-08 PROCEDURE — 99214 OFFICE O/P EST MOD 30 MIN: CPT | Performed by: FAMILY MEDICINE

## 2024-08-08 PROCEDURE — 85025 COMPLETE CBC W/AUTO DIFF WBC: CPT

## 2024-08-08 PROCEDURE — 86618 LYME DISEASE ANTIBODY: CPT

## 2024-08-08 NOTE — PROGRESS NOTES
"Ambulatory Visit  Name: Therese Iglesias      : 1950      MRN: [id number]  Encounter Provider: Yojana Alcala MD  Encounter Date: 2024   Encounter department: [facility]    Assessment & Plan   1. Chronic midline low back pain without sciatica  Assessment & Plan:  She has been doing physical therapy with no significant improvement. Last MRI lumbar spine was in  which showed disc space narrowing and lumbar stenosis. Will get updated XR and have her see pain management.   Orders:  -     Ambulatory referral to Spine & Pain Management; Future  -     XR spine lumbar 2 or 3 views injury; Future; Expected date: 2024  2. Myalgia  -     Lyme Total AB W Reflex to IGM/IGG; Future  -     EBV acute panel; Future  -     WALTER Screen w/ Reflex to Titer/Pattern; Future  -     Vitamin B12; Future  -     CBC and differential; Future  3. Other fatigue  -     Lyme Total AB W Reflex to IGM/IGG; Future  -     EBV acute panel; Future  -     WALTER Screen w/ Reflex to Titer/Pattern; Future  -     Vitamin B12; Future  -     CBC and differential; Future  4. Other abnormalities of gait and mobility  -     Vitamin B12; Future       History of Present Illness     HPI  She has been feeling tired, not like herself and has had body aches for 6 weeks now. Was seen in office in  for possible lyme and treated with doxycycline x 10 days because of her symptoms. She reports myalgias and fatigue. Feels like she has possible EBV or lyme which she has had in the past.   She also has chronic back issues which have not been improving with PT. Would like to see pain management.   Review of Systems   Constitutional:  Positive for fatigue.   Musculoskeletal:  Positive for back pain and myalgias.       Objective     /82   Pulse 87   Temp (!) 97.1 °F (36.2 °C)   Resp 18   Ht 5' 9.5\" (1.765 m)   Wt 59 kg (130 lb)   BMI 18.92 kg/m²     Physical Exam  Constitutional:       General: She is not in acute distress.     " Appearance: She is well-developed. She is not diaphoretic.   HENT:      Head: Normocephalic and atraumatic.   Cardiovascular:      Rate and Rhythm: Normal rate and regular rhythm.      Heart sounds: Normal heart sounds. No murmur heard.     No friction rub. No gallop.   Pulmonary:      Effort: Pulmonary effort is normal. No respiratory distress.      Breath sounds: Normal breath sounds. No wheezing or rales.   Chest:      Chest wall: No tenderness.   Musculoskeletal:         General: No deformity. Normal range of motion.      Cervical back: Normal range of motion and neck supple.   Skin:     General: Skin is warm and dry.   Neurological:      Mental Status: She is alert and oriented to person, place, and time.   Psychiatric:         Behavior: Behavior normal.         Thought Content: Thought content normal.         Judgment: Judgment normal.       Administrative Statements

## 2024-08-08 NOTE — ASSESSMENT & PLAN NOTE
She has been doing physical therapy with no significant improvement. Last MRI lumbar spine was in 2021 which showed disc space narrowing and lumbar stenosis. Will get updated XR and have her see pain management.

## 2024-08-09 LAB
ANA SER QL IA: NEGATIVE
B BURGDOR IGG+IGM SER QL IA: NEGATIVE

## 2024-08-10 LAB
EBV NA IGG SER IA-ACNC: <18 U/ML (ref 0–17.9)
EBV VCA IGG SER IA-ACNC: >600 U/ML (ref 0–17.9)
EBV VCA IGM SER IA-ACNC: <36 U/ML (ref 0–35.9)
INTERPRETATION: ABNORMAL

## 2024-08-12 ENCOUNTER — TELEPHONE (OUTPATIENT)
Dept: FAMILY MEDICINE CLINIC | Facility: CLINIC | Age: 74
End: 2024-08-12

## 2024-08-12 NOTE — TELEPHONE ENCOUNTER
Left voice mail  Please notify of Dr Alcala's message when she calls back  Thank you  Alondra Rae MA

## 2024-08-12 NOTE — TELEPHONE ENCOUNTER
----- Message from Yojana Alcala MD sent at 8/12/2024  4:36 PM EDT -----  Can we please let Therese know that her mono test shows that she has had mono in the past, but no acute/current infection. Lyme test is negative. Blood counts are normal. Her WALTER is negative which means she has no autoimmune diseases. Her vitamin B12   level is high indicating that she may be getting too much vitamin B12. She should consider decreasing her dose of over the counter vitamin B12 if she is taking.

## 2024-08-14 NOTE — TELEPHONE ENCOUNTER
Patient returned call advised patient of 's message and recommendations.Patient aware and understands.

## 2024-08-20 DIAGNOSIS — M54.50 CHRONIC MIDLINE LOW BACK PAIN WITHOUT SCIATICA: ICD-10-CM

## 2024-08-20 DIAGNOSIS — G89.29 CHRONIC MIDLINE LOW BACK PAIN WITHOUT SCIATICA: ICD-10-CM

## 2024-08-20 RX ORDER — GABAPENTIN 300 MG/1
300 CAPSULE ORAL 2 TIMES DAILY
Qty: 60 CAPSULE | Refills: 5 | Status: SHIPPED | OUTPATIENT
Start: 2024-08-20

## 2024-08-20 NOTE — TELEPHONE ENCOUNTER
Reason for call:   [x] Refill   [] Prior Auth  [] Other:     Office:   [x] PCP/Provider - Karina Galvez MD [facility]   [] Specialty/Provider -     Medication: gabapentin (Neurontin) 300 mg capsule     Dose/Frequency: Take 1 capsule (300 mg total) by mouth 2 (two) times a day     Quantity: 60    Pharmacy: [place] [phone]     Does the patient have enough for 3 days?   [x] Yes   [] No - Send as HP to POD

## 2024-09-11 ENCOUNTER — TELEPHONE (OUTPATIENT)
Age: 74
End: 2024-09-11

## 2024-09-11 NOTE — TELEPHONE ENCOUNTER
Patient called is scheduled to see pain management on 9/19/2024 is having pain in her right shoulder, unable to move to much or lay on it, no injury but would like to know if she can have order for XR before her 9/19/2024 appointment so this can be addressed as well. Patient would like a call back when ordered or with thoughts.

## 2024-09-19 ENCOUNTER — APPOINTMENT (OUTPATIENT)
Dept: RADIOLOGY | Facility: CLINIC | Age: 74
End: 2024-09-19
Payer: MEDICARE

## 2024-09-19 ENCOUNTER — CONSULT (OUTPATIENT)
Dept: PAIN MEDICINE | Facility: CLINIC | Age: 74
End: 2024-09-19
Payer: MEDICARE

## 2024-09-19 ENCOUNTER — PREP FOR PROCEDURE (OUTPATIENT)
Dept: OBGYN CLINIC | Facility: CLINIC | Age: 74
End: 2024-09-19

## 2024-09-19 VITALS
HEIGHT: 71 IN | BODY MASS INDEX: 18.2 KG/M2 | SYSTOLIC BLOOD PRESSURE: 129 MMHG | DIASTOLIC BLOOD PRESSURE: 87 MMHG | HEART RATE: 92 BPM | WEIGHT: 130 LBS

## 2024-09-19 DIAGNOSIS — M53.3 COCCYDYNIA: ICD-10-CM

## 2024-09-19 DIAGNOSIS — G89.29 CHRONIC RIGHT SHOULDER PAIN: ICD-10-CM

## 2024-09-19 DIAGNOSIS — M53.3 COCCYDYNIA: Primary | ICD-10-CM

## 2024-09-19 DIAGNOSIS — G89.29 CHRONIC MIDLINE LOW BACK PAIN WITHOUT SCIATICA: ICD-10-CM

## 2024-09-19 DIAGNOSIS — M53.3 COCCYGODYNIA: Primary | ICD-10-CM

## 2024-09-19 DIAGNOSIS — M25.511 CHRONIC RIGHT SHOULDER PAIN: ICD-10-CM

## 2024-09-19 DIAGNOSIS — M54.50 CHRONIC MIDLINE LOW BACK PAIN WITHOUT SCIATICA: ICD-10-CM

## 2024-09-19 PROCEDURE — 99204 OFFICE O/P NEW MOD 45 MIN: CPT | Performed by: STUDENT IN AN ORGANIZED HEALTH CARE EDUCATION/TRAINING PROGRAM

## 2024-09-19 PROCEDURE — 73030 X-RAY EXAM OF SHOULDER: CPT

## 2024-09-19 PROCEDURE — 72220 X-RAY EXAM SACRUM TAILBONE: CPT

## 2024-09-19 RX ORDER — MELOXICAM 15 MG/1
15 TABLET ORAL DAILY PRN
Qty: 14 TABLET | Refills: 0 | Status: SHIPPED | OUTPATIENT
Start: 2024-09-19 | End: 2024-10-03

## 2024-09-19 NOTE — H&P (VIEW-ONLY)
Assessment:  1. Coccydynia    2. Chronic midline low back pain without sciatica    3. Chronic right shoulder pain    Patient is a pleasant 73-year-old woman presenting with 20 years of axial low back pain and buttocks pain but also radicular symptoms down the left leg into her anterior thigh.  Over the past month the intensity pain has been moderate to severe she rates pain currently as a 5 out of 10 on numeric rating scale.  States to 10 out of 10 when she is sitting.  The pain occurs constantly and there is no Pateman symptom better worse.  She describes pain as a burning, sharp, throbbing, dull aching pain with some associated weakness in the lower extremities.  She does not use any assistive devices.  Activities increase her pain include lying down, sitting.  Patient has had x-ray of her lumbar spine from 8/8/2024 which shows mild scoliosis and degenerative disc disease.  Patient has done physical therapy not yet underwent MRI.  She was started on gabapentin 100 g twice daily by her PCP.  States she had a epidural injection with [facility] in the past.  Prior pain treatments include nerve injection provided no relief and physical therapy tried with no relief.  He denies also provided no relief and dry needling provided no relief.  Current medications include Tylenol and Advil.  He is also taking naproxen.  She has not trialed any muscle relaxants.    On further discussion with patient she states she has issues with her right shoulder for the past 3 weeks which is having sharp pain that is lessening.  In regards to her low back that she had some radicular symptoms in her left thigh but this is not distressing for her.  States her most distressing pain is to her buttocks pain when she sits for prolonged periods of time.  She has done physical therapy for this multiple times over the past couple years with no benefit, trialed multiple pillows, anti-inflammatories and ointments without relief.  Given  patient's symptoms of coccydynia think is reasonable to schedule patient for a diagnostic and therapeutic ganglion impar block under fluoroscopic guidance.  Patient counseled on risk and benefits of injection therapy would like to proceed.    Plan:  Start meloxicam 15 mg daily prn   Schedule for diagnostic and therapeutic ganglion impar block under fluoroscopic guidance   XR sacrum and coccyx  XR right shoulder  Orders Placed This Encounter   Procedures    XR shoulder 2+ vw right     Standing Status:   Future     Number of Occurrences:   1     Standing Expiration Date:   9/19/2028     Scheduling Instructions:      Bring along any outside films relating to this procedure.          XR sacrum and coccyx     Standing Status:   Future     Number of Occurrences:   1     Standing Expiration Date:   9/19/2028     Scheduling Instructions:      Bring along any outside films relating to this procedure.             New Medications Ordered This Visit   Medications    meloxicam (MOBIC) 15 mg tablet     Sig: Take 1 tablet (15 mg total) by mouth daily as needed for mild pain or moderate pain for up to 14 days     Dispense:  14 tablet     Refill:  0       My impressions and treatment recommendations were discussed in detail with the patient, who verbalized understanding and had no further questions.      Complete risks and benefits including bleeding, infection, tissue reaction, nerve injury and allergic reaction were discussed. The approach was demonstrated using models and literature was provided. Verbal and written consent was obtained.    Follow-up is planned in four weeks time or sooner as warranted.  Discharge instructions were provided. I personally saw and examined the patient and I agree with the above discussed plan of care.    History of Present Illness:    Therese Iglesias is a 73 y.o. female who presents to [facility] for initial evaluation of the above stated pain complaints. The patient has a  past medical and chronic pain history as outlined in the assessment section. She was referred by Yojana Alcala MD  [address]  [address]  [place] [address] .    Patient is a pleasant 73-year-old woman presenting with 20 years of axial low back pain and buttocks pain but also radicular symptoms down the left leg into her anterior thigh.  Over the past month the intensity pain has been moderate to severe she rates pain currently as a 5 out of 10 on numeric rating scale.  States to 10 out of 10 when she is sitting.  The pain occurs constantly and there is no Pateman symptom better worse.  She describes pain as a burning, sharp, throbbing, dull aching pain with some associated weakness in the lower extremities.  She does not use any assistive devices.  Activities increase her pain include lying down, sitting.  Patient has had x-ray of her lumbar spine from 8/8/2024 which shows mild scoliosis and degenerative disc disease.  Patient has done physical therapy not yet underwent MRI.  She was started on gabapentin 100 g twice daily by her PCP.  States she had a epidural injection with [facility] in the past.  Prior pain treatments include nerve injection provided no relief and physical therapy tried with no relief.  He denies also provided no relief and dry needling provided no relief.  Current medications include Tylenol and Advil.  He is also taking naproxen.  She has not trialed any muscle relaxants.    Review of Systems:    Review of Systems   Constitutional:  Negative for chills and fatigue.   HENT:  Negative for ear pain, mouth sores and sinus pressure.    Eyes:  Negative for pain, redness and visual disturbance.   Respiratory:  Negative for shortness of breath and wheezing.    Cardiovascular:  Negative for chest pain and palpitations.   Gastrointestinal:  Negative for abdominal pain and nausea.   Endocrine: Negative for polyphagia.   Musculoskeletal:  Positive for back pain. Negative for arthralgias  "and neck pain.        Joint and muscle pain ,pain while sitting   Skin:  Negative for wound.   Neurological:  Negative for seizures and weakness.   Psychiatric/Behavioral:  Negative for dysphoric mood and sleep disturbance.            Past Medical History:   Diagnosis Date    Santos Barr virus infection        Past Surgical History:   Procedure Laterality Date    EYE SURGERY      HERNIA REPAIR      KNEE SURGERY      TONSILLECTOMY         Family History   Problem Relation Age of Onset    Alcohol abuse Mother     Alcohol abuse Father     No Known Problems Sister     No Known Problems Sister     No Known Problems Sister     No Known Problems Sister     No Known Problems Daughter     No Known Problems Maternal Grandmother     No Known Problems Maternal Grandfather     No Known Problems Paternal Grandmother     No Known Problems Paternal Grandfather     No Known Problems Paternal Aunt        Social History     Occupational History    Not on file   Tobacco Use    Smoking status: Never     Passive exposure: Never    Smokeless tobacco: Never   Vaping Use    Vaping status: Never Used   Substance and Sexual Activity    Alcohol use: Yes     Comment: rarely    Drug use: Never    Sexual activity: Yes     Partners: Male         Current Outpatient Medications:     clobetasol (TEMOVATE) 0.05 % external solution, if needed, Disp: , Rfl:     gabapentin (Neurontin) 300 mg capsule, Take 1 capsule (300 mg total) by mouth 2 (two) times a day, Disp: 60 capsule, Rfl: 5    hydrocortisone 2.5 % cream, if needed, Disp: , Rfl:     meloxicam (MOBIC) 15 mg tablet, Take 1 tablet (15 mg total) by mouth daily as needed for mild pain or moderate pain for up to 14 days, Disp: 14 tablet, Rfl: 0    No Known Allergies    Physical Exam:    /87   Pulse 92   Ht 5' 10.5\" (1.791 m) Comment: stated  Wt 59 kg (130 lb)   BMI 18.39 kg/m²     Constitutional: normal, well developed, well nourished, alert, in no distress and non-toxic and no overt pain " behavior.  Eyes: anicteric  HEENT: grossly intact  Neck: supple, symmetric, trachea midline and no masses   Pulmonary:even and unlabored  Cardiovascular:No edema or pitting edema present  Skin:Normal without rashes or lesions and well hydrated  Psychiatric:Mood and affect appropriate  Neurologic:Cranial Nerves II-XII grossly intact  Musculoskeletal:normal gait. TTP in midline buttocks area and pain with sitting.     Imaging   XR spine lumbar 2 or 3 views injury  Status: Final result     PACS Images     Show images for XR spine lumbar 2 or 3 views injury    XR spine lumbar 2 or 3 views injury: Result Notes     Yojana Alcala MD  8/9/2024 11:02 AM EDT       Can we please let Therese know that her xray shows mild scoliosis and arthritic changes in her low back, but no acute abnormalities. Thank you.            Study Result    Narrative & Impression         LUMBAR SPINE     INDICATION:   Low back pain, unspecified. Other chronic pain.      COMPARISON:  None.     VIEWS:  XR SPINE LUMBAR 2 OR 3 VIEWS INJURY  Images: 3     FINDINGS:     There are 5 non rib bearing lumbar vertebral bodies.      There is no evidence of acute fracture or destructive osseous lesion.     Mild scoliotic deformity is noted.  Alignment is otherwise unremarkable.      Age-appropriate lumbar degenerative changes are seen.     The pedicles appear intact.     Soft tissues are unremarkable.     IMPRESSION:        No acute osseous abnormality.       Degenerative changes as described.     Electronically signed: 08/09/2024 10:57 AM Dimas Valente MPH,MD     Imaging    XR spine lumbar 2 or 3 views injury (Order: [id number]) - 8/8/2024    Result History    XR spine lumbar 2 or 3 views injury (Order #[id number]) on 8/9/2024 - Order Result History Report    Order Report     Order Details        Result Information    Status Priority Source   Final result (8/9/2024 10:57 AM) Routine        XR spine lumbar 2 or 3 views injury: Result Notes     Yojana Alcala  "MD  8/9/2024 11:02 AM EDT         Can we please let Therese know that her xray shows mild scoliosis and arthritic changes in her low back, but no acute abnormalities. Thank you.              Reason for Exam    Dx: Chronic midline low back pain without sciatica [M54.50, G89.29 (ICD-10-CM)]     All Reviewers List    Amanda Blackman on 8/9/2024  1:36 PM   Yojana Alcala MD on 8/9/2024 11:02 AM         XR spine lumbar 2 or 3 views injury: Patient Communication     Add Comments   Seen       Signed by    Signed Date/Time  Phone Pager   SAMAN LEMUS PILYGUILLERMO 8/09/2024 10:57 [phone]        Exam Information    Status Exam Begun  Exam Ended  Performing Tech   Final [99] 8/08/2024 15:16 8/08/2024 15:34 Brittany Thomas       Questionnaire          Question Answer Comment   1. When should the test be performed?           End Exam      IMAGING END EXAM XRAY    Question Answer Comment   1. Script Info/History/Comments: pt. states she has lower back pain    2. Any additional comments the Radiologist needs to know? 3 views done as per script    3. Was the patient shielded? No    4. Was fluoro used? No    5. Fluoro time? Please type \"MINUTES\" or \"SECONDS\" following your time.     6. Were all the images in this exam within the acceptable exposure index range as posted? Yes    7. If No, provide additional information why (ie which view or position, fixed or AEC, wall/table/tabletop, etc)     8. Number of images sent to PACS: 3    9. Was jewelry removed from the patient? No    10. Jewelry removed:           PATIENT EDUCATION    Question Answer Comment   1. Was the patient educated? Yes    2. Why was the patient not educated?              External Results Report    Open External Results Report      Encounter    View Encounter                     Sedation Documentation Timeline (8/8/2024 00:00 to 8/8/2024 15:35)    An end event has not been filed for the most recent intervention. Due to this intervention’s length, all data may " not appear below. To see all data, file an end event.  8/8/2024 8/8/2024 Event By   10:18:22 Discharge Orders Placed SR   Outpatient Referral  - Ambulatory referral to [facility]  Lab  - Lyme Total AB W Reflex to IGM/IGG; EBV acute panel; WALTER Screen w/ Reflex to Titer/Pattern; Vitamin B12; CBC and differential  Imaging  - XR spine lumbar 2 or 3 views injury         15:15:16 Orders Placed ET   Imaging  - XR spine lumbar 2 or 3 views injury         15:15:16 Order Performed    XR spine lumbar 2 or 3 views injury  - ID: [id number]         15:17 Discharge Specimens Collected    Lyme Total AB W Reflex to IGM/IGG  - ID: [id number] Type: Blood         15:17:46 Discharge Specimens Collected RZ   EBV acute panel  - ID: [id number] Type: Blood  WALTER Screen w/ Reflex to Titer/Pattern  - ID: [id number] Type: Blood  Vitamin B12  - ID: [id number] Type: Blood  CBC and differential  - ID: [id number] Type: Blood  Lyme Total AB W Reflex to IGM/IGG  - ID: [id number] Type: Blood           Pre-op Summary    Pre-op             Recovery Summary    Recovery                 Routing History    None         No orders to display       Orders Placed This Encounter   Procedures    XR shoulder 2+ vw right    XR sacrum and coccyx

## 2024-09-19 NOTE — PROGRESS NOTES
Assessment:  1. Coccydynia    2. Chronic midline low back pain without sciatica    3. Chronic right shoulder pain    Patient is a pleasant 73-year-old woman presenting with 20 years of axial low back pain and buttocks pain but also radicular symptoms down the left leg into her anterior thigh.  Over the past month the intensity pain has been moderate to severe she rates pain currently as a 5 out of 10 on numeric rating scale.  States to 10 out of 10 when she is sitting.  The pain occurs constantly and there is no Pateman symptom better worse.  She describes pain as a burning, sharp, throbbing, dull aching pain with some associated weakness in the lower extremities.  She does not use any assistive devices.  Activities increase her pain include lying down, sitting.  Patient has had x-ray of her lumbar spine from 8/8/2024 which shows mild scoliosis and degenerative disc disease.  Patient has done physical therapy not yet underwent MRI.  She was started on gabapentin 100 g twice daily by her PCP.  States she had a epidural injection with [facility] in the past.  Prior pain treatments include nerve injection provided no relief and physical therapy tried with no relief.  He denies also provided no relief and dry needling provided no relief.  Current medications include Tylenol and Advil.  He is also taking naproxen.  She has not trialed any muscle relaxants.    On further discussion with patient she states she has issues with her right shoulder for the past 3 weeks which is having sharp pain that is lessening.  In regards to her low back that she had some radicular symptoms in her left thigh but this is not distressing for her.  States her most distressing pain is to her buttocks pain when she sits for prolonged periods of time.  She has done physical therapy for this multiple times over the past couple years with no benefit, trialed multiple pillows, anti-inflammatories and ointments without relief.  Given  patient's symptoms of coccydynia think is reasonable to schedule patient for a diagnostic and therapeutic ganglion impar block under fluoroscopic guidance.  Patient counseled on risk and benefits of injection therapy would like to proceed.    Plan:  Start meloxicam 15 mg daily prn   Schedule for diagnostic and therapeutic ganglion impar block under fluoroscopic guidance   XR sacrum and coccyx  XR right shoulder  Orders Placed This Encounter   Procedures    XR shoulder 2+ vw right     Standing Status:   Future     Number of Occurrences:   1     Standing Expiration Date:   9/19/2028     Scheduling Instructions:      Bring along any outside films relating to this procedure.          XR sacrum and coccyx     Standing Status:   Future     Number of Occurrences:   1     Standing Expiration Date:   9/19/2028     Scheduling Instructions:      Bring along any outside films relating to this procedure.             New Medications Ordered This Visit   Medications    meloxicam (MOBIC) 15 mg tablet     Sig: Take 1 tablet (15 mg total) by mouth daily as needed for mild pain or moderate pain for up to 14 days     Dispense:  14 tablet     Refill:  0       My impressions and treatment recommendations were discussed in detail with the patient, who verbalized understanding and had no further questions.      Complete risks and benefits including bleeding, infection, tissue reaction, nerve injury and allergic reaction were discussed. The approach was demonstrated using models and literature was provided. Verbal and written consent was obtained.    Follow-up is planned in four weeks time or sooner as warranted.  Discharge instructions were provided. I personally saw and examined the patient and I agree with the above discussed plan of care.    History of Present Illness:    Therese Iglesias is a 73 y.o. female who presents to [facility] for initial evaluation of the above stated pain complaints. The patient has a  past medical and chronic pain history as outlined in the assessment section. She was referred by Yojana Alcala MD  [address]  [address]  [place] [address] .    Patient is a pleasant 73-year-old woman presenting with 20 years of axial low back pain and buttocks pain but also radicular symptoms down the left leg into her anterior thigh.  Over the past month the intensity pain has been moderate to severe she rates pain currently as a 5 out of 10 on numeric rating scale.  States to 10 out of 10 when she is sitting.  The pain occurs constantly and there is no Pateman symptom better worse.  She describes pain as a burning, sharp, throbbing, dull aching pain with some associated weakness in the lower extremities.  She does not use any assistive devices.  Activities increase her pain include lying down, sitting.  Patient has had x-ray of her lumbar spine from 8/8/2024 which shows mild scoliosis and degenerative disc disease.  Patient has done physical therapy not yet underwent MRI.  She was started on gabapentin 100 g twice daily by her PCP.  States she had a epidural injection with [facility] in the past.  Prior pain treatments include nerve injection provided no relief and physical therapy tried with no relief.  He denies also provided no relief and dry needling provided no relief.  Current medications include Tylenol and Advil.  He is also taking naproxen.  She has not trialed any muscle relaxants.    Review of Systems:    Review of Systems   Constitutional:  Negative for chills and fatigue.   HENT:  Negative for ear pain, mouth sores and sinus pressure.    Eyes:  Negative for pain, redness and visual disturbance.   Respiratory:  Negative for shortness of breath and wheezing.    Cardiovascular:  Negative for chest pain and palpitations.   Gastrointestinal:  Negative for abdominal pain and nausea.   Endocrine: Negative for polyphagia.   Musculoskeletal:  Positive for back pain. Negative for arthralgias  "and neck pain.        Joint and muscle pain ,pain while sitting   Skin:  Negative for wound.   Neurological:  Negative for seizures and weakness.   Psychiatric/Behavioral:  Negative for dysphoric mood and sleep disturbance.            Past Medical History:   Diagnosis Date    Santos Barr virus infection        Past Surgical History:   Procedure Laterality Date    EYE SURGERY      HERNIA REPAIR      KNEE SURGERY      TONSILLECTOMY         Family History   Problem Relation Age of Onset    Alcohol abuse Mother     Alcohol abuse Father     No Known Problems Sister     No Known Problems Sister     No Known Problems Sister     No Known Problems Sister     No Known Problems Daughter     No Known Problems Maternal Grandmother     No Known Problems Maternal Grandfather     No Known Problems Paternal Grandmother     No Known Problems Paternal Grandfather     No Known Problems Paternal Aunt        Social History     Occupational History    Not on file   Tobacco Use    Smoking status: Never     Passive exposure: Never    Smokeless tobacco: Never   Vaping Use    Vaping status: Never Used   Substance and Sexual Activity    Alcohol use: Yes     Comment: rarely    Drug use: Never    Sexual activity: Yes     Partners: Male         Current Outpatient Medications:     clobetasol (TEMOVATE) 0.05 % external solution, if needed, Disp: , Rfl:     gabapentin (Neurontin) 300 mg capsule, Take 1 capsule (300 mg total) by mouth 2 (two) times a day, Disp: 60 capsule, Rfl: 5    hydrocortisone 2.5 % cream, if needed, Disp: , Rfl:     meloxicam (MOBIC) 15 mg tablet, Take 1 tablet (15 mg total) by mouth daily as needed for mild pain or moderate pain for up to 14 days, Disp: 14 tablet, Rfl: 0    No Known Allergies    Physical Exam:    /87   Pulse 92   Ht 5' 10.5\" (1.791 m) Comment: stated  Wt 59 kg (130 lb)   BMI 18.39 kg/m²     Constitutional: normal, well developed, well nourished, alert, in no distress and non-toxic and no overt pain " behavior.  Eyes: anicteric  HEENT: grossly intact  Neck: supple, symmetric, trachea midline and no masses   Pulmonary:even and unlabored  Cardiovascular:No edema or pitting edema present  Skin:Normal without rashes or lesions and well hydrated  Psychiatric:Mood and affect appropriate  Neurologic:Cranial Nerves II-XII grossly intact  Musculoskeletal:normal gait. TTP in midline buttocks area and pain with sitting.     Imaging   XR spine lumbar 2 or 3 views injury  Status: Final result     PACS Images     Show images for XR spine lumbar 2 or 3 views injury    XR spine lumbar 2 or 3 views injury: Result Notes     Yojana Alcala MD  8/9/2024 11:02 AM EDT       Can we please let Therese know that her xray shows mild scoliosis and arthritic changes in her low back, but no acute abnormalities. Thank you.            Study Result    Narrative & Impression         LUMBAR SPINE     INDICATION:   Low back pain, unspecified. Other chronic pain.      COMPARISON:  None.     VIEWS:  XR SPINE LUMBAR 2 OR 3 VIEWS INJURY  Images: 3     FINDINGS:     There are 5 non rib bearing lumbar vertebral bodies.      There is no evidence of acute fracture or destructive osseous lesion.     Mild scoliotic deformity is noted.  Alignment is otherwise unremarkable.      Age-appropriate lumbar degenerative changes are seen.     The pedicles appear intact.     Soft tissues are unremarkable.     IMPRESSION:        No acute osseous abnormality.       Degenerative changes as described.     Electronically signed: 08/09/2024 10:57 AM Dimas Valente MPH,MD     Imaging    XR spine lumbar 2 or 3 views injury (Order: [id number]) - 8/8/2024    Result History    XR spine lumbar 2 or 3 views injury (Order #[id number]) on 8/9/2024 - Order Result History Report    Order Report     Order Details        Result Information    Status Priority Source   Final result (8/9/2024 10:57 AM) Routine        XR spine lumbar 2 or 3 views injury: Result Notes     Yojana Alcala  "MD  8/9/2024 11:02 AM EDT         Can we please let Therese know that her xray shows mild scoliosis and arthritic changes in her low back, but no acute abnormalities. Thank you.              Reason for Exam    Dx: Chronic midline low back pain without sciatica [M54.50, G89.29 (ICD-10-CM)]     All Reviewers List    Amanda Blackman on 8/9/2024  1:36 PM   Yojana Alcala MD on 8/9/2024 11:02 AM         XR spine lumbar 2 or 3 views injury: Patient Communication     Add Comments   Seen       Signed by    Signed Date/Time  Phone Pager   SAMAN LEMUS PILYGUILLERMO 8/09/2024 10:57 [phone]        Exam Information    Status Exam Begun  Exam Ended  Performing Tech   Final [99] 8/08/2024 15:16 8/08/2024 15:34 Brittany Thomas       Questionnaire          Question Answer Comment   1. When should the test be performed?           End Exam      IMAGING END EXAM XRAY    Question Answer Comment   1. Script Info/History/Comments: pt. states she has lower back pain    2. Any additional comments the Radiologist needs to know? 3 views done as per script    3. Was the patient shielded? No    4. Was fluoro used? No    5. Fluoro time? Please type \"MINUTES\" or \"SECONDS\" following your time.     6. Were all the images in this exam within the acceptable exposure index range as posted? Yes    7. If No, provide additional information why (ie which view or position, fixed or AEC, wall/table/tabletop, etc)     8. Number of images sent to PACS: 3    9. Was jewelry removed from the patient? No    10. Jewelry removed:           PATIENT EDUCATION    Question Answer Comment   1. Was the patient educated? Yes    2. Why was the patient not educated?              External Results Report    Open External Results Report      Encounter    View Encounter                     Sedation Documentation Timeline (8/8/2024 00:00 to 8/8/2024 15:35)    An end event has not been filed for the most recent intervention. Due to this intervention’s length, all data may " not appear below. To see all data, file an end event.  8/8/2024 8/8/2024 Event By   10:18:22 Discharge Orders Placed SR   Outpatient Referral  - Ambulatory referral to Spine & Pain Management  Lab  - Lyme Total AB W Reflex to IGM/IGG; EBV acute panel; WALTER Screen w/ Reflex to Titer/Pattern; Vitamin B12; CBC and differential  Imaging  - XR spine lumbar 2 or 3 views injury         15:15:16 Orders Placed ET   Imaging  - XR spine lumbar 2 or 3 views injury         15:15:16 Order Performed    XR spine lumbar 2 or 3 views injury  - ID: [id number]         15:17 Discharge Specimens Collected    Lyme Total AB W Reflex to IGM/IGG  - ID: [id number] Type: Blood         15:17:46 Discharge Specimens Collected RZ   EBV acute panel  - ID: [id number] Type: Blood  WALTER Screen w/ Reflex to Titer/Pattern  - ID: [id number] Type: Blood  Vitamin B12  - ID: [id number] Type: Blood  CBC and differential  - ID: [id number] Type: Blood  Lyme Total AB W Reflex to IGM/IGG  - ID: [id number] Type: Blood           Pre-op Summary    Pre-op             Recovery Summary    Recovery                 Routing History    None         No orders to display       Orders Placed This Encounter   Procedures    XR shoulder 2+ vw right    XR sacrum and coccyx

## 2024-10-11 ENCOUNTER — APPOINTMENT (OUTPATIENT)
Dept: RADIOLOGY | Facility: HOSPITAL | Age: 74
End: 2024-10-11
Payer: MEDICARE

## 2024-10-11 ENCOUNTER — HOSPITAL ENCOUNTER (OUTPATIENT)
Facility: AMBULARY SURGERY CENTER | Age: 74
Setting detail: OUTPATIENT SURGERY
Discharge: HOME | End: 2024-10-11
Attending: STUDENT IN AN ORGANIZED HEALTH CARE EDUCATION/TRAINING PROGRAM | Admitting: STUDENT IN AN ORGANIZED HEALTH CARE EDUCATION/TRAINING PROGRAM
Payer: MEDICARE

## 2024-10-11 VITALS
TEMPERATURE: 98 F | RESPIRATION RATE: 18 BRPM | DIASTOLIC BLOOD PRESSURE: 89 MMHG | SYSTOLIC BLOOD PRESSURE: 145 MMHG | OXYGEN SATURATION: 98 % | HEART RATE: 91 BPM

## 2024-10-11 PROBLEM — M53.3 COCCYDYNIA: Status: ACTIVE | Noted: 2024-10-11

## 2024-10-11 PROCEDURE — NC001 PR NO CHARGE: Performed by: STUDENT IN AN ORGANIZED HEALTH CARE EDUCATION/TRAINING PROGRAM

## 2024-10-11 PROCEDURE — 64999 UNLISTED PX NERVOUS SYSTEM: CPT | Performed by: STUDENT IN AN ORGANIZED HEALTH CARE EDUCATION/TRAINING PROGRAM

## 2024-10-11 RX ORDER — LIDOCAINE HYDROCHLORIDE 10 MG/ML
INJECTION, SOLUTION EPIDURAL; INFILTRATION; INTRACAUDAL; PERINEURAL AS NEEDED
Status: DISCONTINUED | OUTPATIENT
Start: 2024-10-11 | End: 2024-10-11 | Stop reason: HOSPADM

## 2024-10-11 RX ORDER — BUPIVACAINE HYDROCHLORIDE 2.5 MG/ML
INJECTION, SOLUTION EPIDURAL; INFILTRATION; INTRACAUDAL AS NEEDED
Status: DISCONTINUED | OUTPATIENT
Start: 2024-10-11 | End: 2024-10-11 | Stop reason: HOSPADM

## 2024-10-11 RX ORDER — METHYLPREDNISOLONE ACETATE 40 MG/ML
INJECTION, SUSPENSION INTRA-ARTICULAR; INTRALESIONAL; INTRAMUSCULAR; SOFT TISSUE AS NEEDED
Status: DISCONTINUED | OUTPATIENT
Start: 2024-10-11 | End: 2024-10-11 | Stop reason: HOSPADM

## 2024-10-11 NOTE — OP NOTE
Pre-procedure Diagnosis:  Coccydynia  Post-procedure Diagnosis: Coccydynia  Procedure Title(s):  1.  Ganglion impar block      2. Intraoperative fluoroscopy  Attending Surgeon:   Fernando Sarmiento MD  Anesthesia:   Local     Indications: The patient is a 73 y.o. y.o. year-old female with a diagnosis of coccydynia. The patient's history and physical exam were reviewed.   The risks, benefits and alternatives to the procedure were discussed, and all questions were answered to the patient's satisfaction. The patient agreed to proceed, and written informed consent was obtained.    Procedure in Detail: The patient was brought into the procedure room and placed in the prone position on the fluoroscopy table. The area of the lumbar/sacral spine was prepped with chlorhexidine gluconate solution times one and draped in a sterile manner.      The sacrum was identified with AP and lateral fluoroscopy. Then, 1% lidocaine was used to anesthetize the skin. A 22-gauge spinal needle was directed toward the sacrococcygeal ligament under lateral fluoroscopy. Once the needle was anterior to the sacrum, negative aspiration was confirmed and Omnipaque 300 was injected. The ganglion of impar was appropriately outlined on lateral fluoroscopic views.    Then, after negative aspiration, a solution consisting of 1-mL depo-medrol (80mg/mL) and 5-mL bupivacaine 0.25% was easily injected. The needle was removed with a 1% lidocaine flush. The patient's back was cleaned and a bandage was placed over the site of needle insertion.    Disposition: The patient tolerated the procedure well, and there were no apparent complications. The patient was taken to the recovery area where written discharge instructions for the procedure were given.     Estimated Blood Loss: None  Specimens Obtained: N/A

## 2024-10-25 ENCOUNTER — TELEPHONE (OUTPATIENT)
Dept: PAIN MEDICINE | Facility: CLINIC | Age: 74
End: 2024-10-25

## 2024-10-25 NOTE — TELEPHONE ENCOUNTER
1st attempt  Lm to cb with % improvement and pain level.        Addended by: TANK CHILDS on: 3/29/2022 12:15 PM     Modules accepted: Orders

## 2024-11-01 NOTE — TELEPHONE ENCOUNTER
Caller: Therese   Doctor/office: MARIANN ENAMORADO#: [phone]    % of improvement: 0%  Pain Scale (1-10): 7-10    Pt advised that the pain from her tailbone only goes away if she stands up. She advised she has had no relief as of yet. She would like to also try a procedure for her sciatica

## 2024-11-30 DIAGNOSIS — M54.50 CHRONIC MIDLINE LOW BACK PAIN WITHOUT SCIATICA: ICD-10-CM

## 2024-11-30 DIAGNOSIS — G89.29 CHRONIC MIDLINE LOW BACK PAIN WITHOUT SCIATICA: ICD-10-CM

## 2024-12-02 RX ORDER — GABAPENTIN 300 MG/1
300 CAPSULE ORAL
Qty: 90 CAPSULE | Refills: 1 | Status: SHIPPED | OUTPATIENT
Start: 2024-12-02

## 2024-12-10 ENCOUNTER — APPOINTMENT (OUTPATIENT)
Dept: RADIOLOGY | Facility: CLINIC | Age: 74
End: 2024-12-10
Payer: MEDICARE

## 2024-12-10 ENCOUNTER — OFFICE VISIT (OUTPATIENT)
Dept: URGENT CARE | Facility: CLINIC | Age: 74
End: 2024-12-10
Payer: MEDICARE

## 2024-12-10 ENCOUNTER — RESULTS FOLLOW-UP (OUTPATIENT)
Dept: URGENT CARE | Facility: CLINIC | Age: 74
End: 2024-12-10

## 2024-12-10 VITALS
HEART RATE: 72 BPM | RESPIRATION RATE: 16 BRPM | WEIGHT: 130 LBS | OXYGEN SATURATION: 97 % | TEMPERATURE: 97.5 F | BODY MASS INDEX: 18.2 KG/M2 | DIASTOLIC BLOOD PRESSURE: 72 MMHG | SYSTOLIC BLOOD PRESSURE: 120 MMHG | HEIGHT: 71 IN

## 2024-12-10 DIAGNOSIS — S29.9XXA RIB INJURY: Primary | ICD-10-CM

## 2024-12-10 PROCEDURE — 71101 X-RAY EXAM UNILAT RIBS/CHEST: CPT

## 2024-12-10 PROCEDURE — 99204 OFFICE O/P NEW MOD 45 MIN: CPT

## 2024-12-10 RX ORDER — TIZANIDINE 2 MG/1
2 TABLET ORAL EVERY 8 HOURS PRN
Qty: 20 TABLET | Refills: 0 | Status: SHIPPED | OUTPATIENT
Start: 2024-12-10

## 2024-12-10 NOTE — PROGRESS NOTES
[facility]        NAME: Therese Iglesias is a 74 y.o. female  : 1950    MRN: [id number]  DATE: December 10, 2024  TIME: 11:03 AM    Assessment and Plan   Rib injury [S29.9XXA]  1. Rib injury  XR ribs left w pa chest min 3 views    tiZANidine (ZANAFLEX) 2 mg tablet        XR L ribs- No acute osseous abnormality noted. Pending final radiology read.     Bruised rib vs muscular pain after fall. Supportive management.     Patient Instructions     Ensure you are taking full deep breaths   Gentle stretching as tolerated  Heat or ice to site of injury as directed  20 minutes at a time with breaks in between   Ice for the first 2-3 days, heat after unless continued swelling noted  Ibuprofen and Tylenol for pain as needed     Follow up with PCP in 3-5 days   Proceed to ER if worsening symptoms     If tests are performed, our office will contact you with results only if changes need to made to the care plan discussed with you at the visit. You can review your full results on [facility].    Chief Complaint     Chief Complaint   Patient presents with    Fall     Pt fell a week ago and has pain on her left rib.          History of Present Illness       Fall  The accident occurred 5 to 7 days ago. The fall occurred while standing. She landed on Carpet. There was no blood loss. Point of impact: left side of body. Pain location: right ribs. The pain is moderate. Exacerbated by: laying down, certain motions. Pertinent negatives include no abdominal pain, bowel incontinence, fever, headaches, hearing loss, loss of consciousness, nausea, numbness, tingling, visual change or vomiting. She has tried acetaminophen for the symptoms. The treatment provided mild relief.       Review of Systems   Review of Systems   Constitutional:  Negative for chills and fever.   HENT:  Negative for congestion, postnasal drip, rhinorrhea, sinus pressure, sore throat and trouble swallowing.    Respiratory:  Negative for cough, chest  tightness and shortness of breath.    Cardiovascular:  Negative for chest pain and palpitations.   Gastrointestinal:  Negative for abdominal pain, bowel incontinence, nausea and vomiting.   Genitourinary:  Negative for difficulty urinating.   Musculoskeletal:  Negative for myalgias.   Neurological:  Negative for dizziness, tingling, loss of consciousness, numbness and headaches.         Current Medications       Current Outpatient Medications:     clobetasol (TEMOVATE) 0.05 % external solution, if needed, Disp: , Rfl:     gabapentin (NEURONTIN) 300 mg capsule, TAKE 1 CAPSULE BY MOUTH DAILY AT BEDTIME, Disp: 90 capsule, Rfl: 1    hydrocortisone 2.5 % cream, if needed, Disp: , Rfl:     tiZANidine (ZANAFLEX) 2 mg tablet, Take 1 tablet (2 mg total) by mouth every 8 (eight) hours as needed for muscle spasms, Disp: 20 tablet, Rfl: 0    meloxicam (MOBIC) 15 mg tablet, Take 1 tablet (15 mg total) by mouth daily as needed for mild pain or moderate pain for up to 14 days, Disp: 14 tablet, Rfl: 0    Current Allergies     Allergies as of 12/10/2024    (No Known Allergies)            The following portions of the patient's history were reviewed and updated as appropriate: allergies, current medications, past family history, past medical history, past social history, past surgical history and problem list.     Past Medical History:   Diagnosis Date    Santos Barr virus infection        Past Surgical History:   Procedure Laterality Date    EYE SURGERY      HERNIA REPAIR      KNEE SURGERY      RI UNLISTED PROCEDURE NERVOUS SYSTEM N/A 10/11/2024    Procedure: BLOCK GANGLION IMPAR;  Surgeon: Fernando Sarmiento MD;  [facility]: [facility] MAIN OR;  Service: Pain Management     TONSILLECTOMY         Family History   Problem Relation Age of Onset    Alcohol abuse Mother     Alcohol abuse Father     No Known Problems Sister     No Known Problems Sister     No Known Problems Sister     No Known Problems Sister     No Known Problems Daughter      "No Known Problems Maternal Grandmother     No Known Problems Maternal Grandfather     No Known Problems Paternal Grandmother     No Known Problems Paternal Grandfather     No Known Problems Paternal Aunt          Medications have been verified.        Objective   /72   Pulse 72   Temp 97.5 °F (36.4 °C)   Resp 16   Ht 5' 10.5\" (1.791 m)   Wt 59 kg (130 lb)   SpO2 97%   BMI 18.39 kg/m²        Physical Exam     Physical Exam  Constitutional:       General: She is not in acute distress.  HENT:      Head: Normocephalic.      Nose: Nose normal.   Eyes:      Pupils: Pupils are equal, round, and reactive to light.   Cardiovascular:      Rate and Rhythm: Normal rate and regular rhythm.      Pulses: Normal pulses.      Heart sounds: Normal heart sounds.   Pulmonary:      Effort: Pulmonary effort is normal. No respiratory distress.      Breath sounds: Normal breath sounds. No stridor. No wheezing, rhonchi or rales.   Chest:       Abdominal:      General: Abdomen is flat.   Musculoskeletal:         General: Normal range of motion.   Skin:     General: Skin is warm and dry.      Capillary Refill: Capillary refill takes less than 2 seconds.   Neurological:      Mental Status: She is alert and oriented to person, place, and time.                   "

## 2024-12-10 NOTE — PATIENT INSTRUCTIONS
Ensure you are taking full deep breaths   Gentle stretching as tolerated  Heat or ice to site of injury as directed  20 minutes at a time with breaks in between   Ice for the first 2-3 days, heat after unless continued swelling noted  Ibuprofen and Tylenol for pain as needed     Follow up with PCP in 3-5 days   Proceed to ER if worsening symptoms     If tests are performed, our office will contact you with results only if changes need to made to the care plan discussed with you at the visit. You can review your full results on [facility].

## 2024-12-26 DIAGNOSIS — M54.50 CHRONIC MIDLINE LOW BACK PAIN WITHOUT SCIATICA: ICD-10-CM

## 2024-12-26 DIAGNOSIS — G89.29 CHRONIC MIDLINE LOW BACK PAIN WITHOUT SCIATICA: ICD-10-CM

## 2024-12-27 RX ORDER — GABAPENTIN 300 MG/1
300 CAPSULE ORAL
Qty: 90 CAPSULE | Refills: 1 | Status: SHIPPED | OUTPATIENT
Start: 2024-12-27

## 2025-01-15 DIAGNOSIS — G89.29 CHRONIC MIDLINE LOW BACK PAIN WITHOUT SCIATICA: ICD-10-CM

## 2025-01-15 DIAGNOSIS — M54.50 CHRONIC MIDLINE LOW BACK PAIN WITHOUT SCIATICA: ICD-10-CM

## 2025-01-15 RX ORDER — GABAPENTIN 300 MG/1
300 CAPSULE ORAL
Qty: 90 CAPSULE | Refills: 1 | OUTPATIENT
Start: 2025-01-15

## 2025-01-16 ENCOUNTER — TELEPHONE (OUTPATIENT)
Dept: FAMILY MEDICINE CLINIC | Facility: CLINIC | Age: 75
End: 2025-01-16

## 2025-01-16 DIAGNOSIS — G89.29 CHRONIC MIDLINE LOW BACK PAIN WITHOUT SCIATICA: ICD-10-CM

## 2025-01-16 DIAGNOSIS — M54.50 CHRONIC MIDLINE LOW BACK PAIN WITHOUT SCIATICA: ICD-10-CM

## 2025-01-16 RX ORDER — GABAPENTIN 300 MG/1
300 CAPSULE ORAL 2 TIMES DAILY
Qty: 180 CAPSULE | Refills: 1 | Status: SHIPPED | OUTPATIENT
Start: 2025-01-16

## 2025-01-16 NOTE — TELEPHONE ENCOUNTER
Patient called the RX Refill Line. Message is being forwarded to the office.     Patient is requesting a new script for the gabapentin, stated that back in June Dr Galvez said that she can take 2 daily but any refill she's gotten since then has been for one at bedtime    Pt stated that she started only taking one so that she wouldn't run out of them but she'd like a new script for a the 300 mg twice daily #270 (stated a three month supply would be easier for her) sent to the [place] pharmacy  Pt would need new script sent before the weekend so she can go back to taking it twice daily     Please contact patient at  if any questions

## 2025-01-21 ENCOUNTER — OFFICE VISIT (OUTPATIENT)
Dept: PHYSICAL THERAPY | Facility: CLINIC | Age: 75
End: 2025-01-21
Payer: MEDICARE

## 2025-01-21 DIAGNOSIS — M54.16 LUMBAR RADICULOPATHY: Primary | ICD-10-CM

## 2025-01-21 DIAGNOSIS — G89.29 CHRONIC RIGHT SHOULDER PAIN: ICD-10-CM

## 2025-01-21 DIAGNOSIS — M25.511 CHRONIC RIGHT SHOULDER PAIN: ICD-10-CM

## 2025-01-21 PROCEDURE — 97161 PT EVAL LOW COMPLEX 20 MIN: CPT

## 2025-01-21 NOTE — PROGRESS NOTES
PT Evaluation     Today's date: 2025  Patient name: Therese Iglesias  : 1950  MRN: [id number]  Referring provider: Self, Referral  Dx:   Encounter Diagnosis     ICD-10-CM    1. Lumbar radiculopathy  M54.16       2. Chronic right shoulder pain  M25.511     G89.29               Assessment  Assessment details: Patient is a 74 y.o. Female who presents to skilled outpatient PT with referring diagnosis of lumbar radiculopathy and chronic right shoulder pain. Primary movement impairment diagnosis of mobility and functional mobility deficits resulting in pathoanatomical symptoms of lumbar spine and B gluteal pain. The aforementioned impairments have limited the patient's ability to sit and drive or greater than an hour. Patient education performed during today's session included: HEP as noted on flow sheet, nature of lumbar radiculopathy, and postural education. Patient verbalized understanding of POC.    Impairments: Abnormal or restricted ROM, Impaired physical strength, Lacks appropriate HEP, and Pain with function  Understanding of Dx/Px/POC: Excellent  Prognosis: Excellent    Dry needling consent for reviewed and signed by patient. Pt educated on dry needling to include but not limited to: risks/benefits/aftercare instructions. Provided with educational handout on DN. All questions and concerns answered and addressed.  Pt verbally consented to dry needling treatment session. Risks/benefits/aftercare instructions reviewed. All questions/concerns addressed.  Pt in prone position. Hand hygiene performed pre and post DN treatment session. Placement of needles in pathological tissue.   Ears, cervical and lumbar spine (needle location).  Total treatment duration to include set up/break down/in situ: 40 minutes. Patient was supervised by clinician throughout entirety of treatment session to include when DN in situ; clinician next to patient. All needles counted upon insertion and removal-- 14 needles. All  accounted for and disposed in appropriate sharp container.     No adverse reaction to treatment. Pt advised may experience bruising, soreness, fatigue, bleeding from needle removal site. Pt verbalized understanding.         Plan  Patient would benefit from: PT Eval and Skilled PT  Planned modality interventions: Dry needling, Biofeedback, Cryotherapy, TENS, Thermotherapy: Hydrocollator Packs, and Traction  Planned therapy interventions: Abdominal trunk stabilization, ADL training, Balance, Balance/WB training, Breathing training, Body mechanics training, Dry Needling, Functional ROM exercises, Gait training, HEP, Joint mobilization, Manual therapy, Henry taping, Neuromuscular re-education, Patient education, Postural training, Strengthening, Stretching, Therapeutic activities, Therapeutic exercises, Therapeutic training, and Activity modification  Frequency: 2x/wk  Duration in weeks: 6  Plan of Care beginning date: 1/21/25  Plan of Care expiration date: 6 weeks - 3/4/2025  Treatment plan discussed with: Patient     Will assess R shoulder next visit.       Goals  Short Term Goals (4 weeks):    - Patient will be independent in basic HEP 2-3 weeks  - Patient will have 0/10 pain at rest  - Patient will demonstrate >1/3 improvement in MMT grade as applicable  - Patient functional goal: Patient will sit for >1 hour with no pain.     Long Term Goals (8 weeks):  - Patient will be independent in a comprehensive home exercise program  - Patient FOTO score will improve by 10 points.   - Patient will self-report >75% improvement in function  - Patient functional goal: Patient will drive >2 hours with no pain.       Subjective    History of Present Illness  - Mechanism of injury: Patient reports long standing history (15 years) of lumbar spine pain. She has increased pain around her tailbone, radiating out into the lateral aspect of her glutes. She reports her pain as burning. She has relief of symptoms when she is standing  "and walking. She takes gabapentin at night with CBD oil and is able to sleep \"like a baby.\" She has had several rounds of PT, with no help. She is to go on a trip to [place] next week.     - Updated subjective as of 25: Patient reports increased pain when she is sitting for long periods of time. She also reports increased pain of her R shoulder after she was sleeping on it for long periods of time. Reaching overhead, behind back, laying on her R side, lifting objects of weight.     - Functional limitations: sitting and driving > 45 minutes-1 hour    - Patient goals: \"Sit for a while.\"       Pain  - Current pain ratin/10  - At best pain ratin/10  - At worst pain rating: 10/10  - Location: lumbar spine, B glutes   - Alleviating factors: gabapentin        Objective   LE MMT    L Hip Flexion: 5/5  R Hip Flexion: 5/5   L Hip Extension: 5/5 R Hip Extension: 5/5   L Hip Abduction: 5/5 R Hip Abduction: 5/5   L Hip Adduction: 5/5 R Hip Adduction: 5/5   L Knee Extension: 5/5 R Knee Extension: 5/5   L Knee Flexion: 5/5 R Knee Flexion: 5/5   L Ankle DF: 5/5 R Ankle DF: 5/5   L Ankle PF: 5/5  R Ankle PF: 5/5         Movement Loss Pradeep Mod Min Nil Symptoms   Flexion   x  no pain    Extension    x no pain    Side gliding R    x no pain    Side gliding L    x no pain    R rotation    x no pain    L rotation    x no pain        Sensation  - Light touch: intact     Palpation   -TTP B PSIS       Special Testing L R   FABIR negative negative   FADIR negative negative   Hip Scour  negative negative   Thigh thrust negative negative   Flick test  negative negative   Standing flexion test  negative negative   Prone knee flexion test negative negative   Supine to long sit test negative negative   Neurodynamic assessment  NT NT         Precautions: standard     Past Medical History:   Diagnosis Date    Santos Barr virus infection        Insurance:  [employer]/CMS Eval/ Re-eval POC expires FOTO Auth #/ Referral # Total   Visits  Start " date  Expiration date Extension  Visit limitation?  PT only or  PT+OT? Co-Insurance   CMS 6/20 8/1  No auth required     BOMN  PT $0 Co-pay                                                               Date 1/21/2025        Visit Number IE    FOTO             Manual         P-A mobs         Dry needling with estim                           Neuro Re-ed         TrA progression         Hip add squeeze         Clamshells          Bridge          Sciatic nerve sliders                  Thera Ex         YOVANI/REIL         TB shoulder ext         TB shoulder row         Paloff press          Anti-rotation press                                    Thera Activity         Patient education         Lifting mechanics         Posture education                                                      Modalities         Mechanical lumbar traction 30' total  5-55# min-max  6 step ramp up/down

## 2025-01-23 ENCOUNTER — OFFICE VISIT (OUTPATIENT)
Dept: PHYSICAL THERAPY | Facility: CLINIC | Age: 75
End: 2025-01-23
Payer: MEDICARE

## 2025-01-23 DIAGNOSIS — M25.511 CHRONIC RIGHT SHOULDER PAIN: ICD-10-CM

## 2025-01-23 DIAGNOSIS — G89.29 CHRONIC RIGHT SHOULDER PAIN: ICD-10-CM

## 2025-01-23 DIAGNOSIS — M54.16 LUMBAR RADICULOPATHY: Primary | ICD-10-CM

## 2025-01-23 PROCEDURE — 97012 MECHANICAL TRACTION THERAPY: CPT

## 2025-01-23 PROCEDURE — 97110 THERAPEUTIC EXERCISES: CPT

## 2025-01-23 NOTE — PROGRESS NOTES
Daily Note     Today's date: 2025  Patient name: Therese Iglesias  : 1950  MRN: [id number]  Referring provider: Self, Referral  Dx:   Encounter Diagnosis     ICD-10-CM    1. Lumbar radiculopathy  M54.16       2. Chronic right shoulder pain  M25.511     G89.29           Start Time: 1145  Stop Time: 1220  Total time in clinic (min): 35 minutes    Subjective: Patient reports no change in her symptoms since her evaluation.       Objective: See treatment diary below      Assessment: Tolerated treatment well. Performed lumbar mechanical traction today, with minimal pain noted post tx. Added shoulder exercises today for patient's increased symptoms since her evaluation. Advised patient to contact orthopedic regarding continued shoulder pain. Verbalized understanding. Patient would benefit from continued PT      Plan: Continue per plan of care.      Precautions: standard     Past Medical History:   Diagnosis Date    Santos Barr virus infection        Insurance:  A/CMS Eval/ Re-eval POC expires FOTO Auth #/ Referral # Total   Visits  Start date  Expiration date Extension  Visit limitation?  PT only or  PT+OT? Co-Insurance   CMS   No auth required     BOMN  PT $0 Co-pay                                                               Date 2025       Visit Number IE 2   FOTO             Manual         P-A mobs         Dry needling with estim                           Neuro Re-ed         TrA progression         Hip add squeeze         Clamshells          Bridge          Sciatic nerve sliders                  Thera Ex         Repeated shoulder ext  10       TB shoulder ext         TB shoulder row         Paloff press          Anti-rotation press                                    Thera Activity         Patient education  5'        Lifting mechanics         Posture education                                                      Modalities         Mechanical lumbar traction 30' total  5-55#  min-max  6 step ramp up/down 30' total  5-70# min-max  6 step ramp up/down

## 2025-01-28 ENCOUNTER — OFFICE VISIT (OUTPATIENT)
Dept: PHYSICAL THERAPY | Facility: CLINIC | Age: 75
End: 2025-01-28
Payer: MEDICARE

## 2025-01-28 DIAGNOSIS — M25.511 CHRONIC RIGHT SHOULDER PAIN: ICD-10-CM

## 2025-01-28 DIAGNOSIS — G89.29 CHRONIC RIGHT SHOULDER PAIN: ICD-10-CM

## 2025-01-28 DIAGNOSIS — M54.16 LUMBAR RADICULOPATHY: Primary | ICD-10-CM

## 2025-01-28 PROCEDURE — 97012 MECHANICAL TRACTION THERAPY: CPT

## 2025-01-28 NOTE — PROGRESS NOTES
Daily Note     Today's date: 2025  Patient name: Therese Iglesias  : 1950  MRN: [id number]  Referring provider: Self, Referral  Dx:   Encounter Diagnosis     ICD-10-CM    1. Lumbar radiculopathy  M54.16       2. Chronic right shoulder pain  M25.511     G89.29             Start Time: 1145  Stop Time: 1220  Total time in clinic (min): 35 minutes    Subjective: Patient continues to have increased pain when she is still longer periods of time.        Objective: See treatment diary below      Assessment: Tolerated treatment well. Lumbar mechanical traction performed today with no adverse effects noted. Discussed sitting postures when in the car. Will continue to progress as tolerated. Patient would benefit from continued PT      Plan: Continue per plan of care.      Precautions: standard     Past Medical History:   Diagnosis Date    Santos Barr virus infection        Insurance:  [employer]/CMS Eval/ Re-eval POC expires FOTO Auth #/ Referral # Total   Visits  Start date  Expiration date Extension  Visit limitation?  PT only or  PT+OT? Co-Insurance   CMS   No auth required     BOMN  PT $0 Co-pay                                                               Date 2025      Visit Number IE 2 3  FOTO             Manual         P-A mobs         Dry needling with estim                           Neuro Re-ed         TrA progression         Hip add squeeze         Clamshells          Bridge          Sciatic nerve sliders                  Thera Ex         Repeated shoulder ext  10       TB shoulder ext         TB shoulder row         Paloff press          Anti-rotation press                                    Thera Activity         Patient education  5'        Lifting mechanics         Posture education                                                      Modalities         Mechanical lumbar traction 30' total  5-55# min-max  6 step ramp up/down 30' total  5-70# min-max  6 step ramp up/down 30'  total  5-75# min-max  6 step ramp up/down

## 2025-01-30 ENCOUNTER — APPOINTMENT (OUTPATIENT)
Dept: PHYSICAL THERAPY | Facility: CLINIC | Age: 75
End: 2025-01-30
Payer: MEDICARE

## 2025-01-31 ENCOUNTER — OFFICE VISIT (OUTPATIENT)
Dept: PHYSICAL THERAPY | Facility: CLINIC | Age: 75
End: 2025-01-31
Payer: MEDICARE

## 2025-01-31 DIAGNOSIS — G89.29 CHRONIC RIGHT SHOULDER PAIN: ICD-10-CM

## 2025-01-31 DIAGNOSIS — M54.16 LUMBAR RADICULOPATHY: Primary | ICD-10-CM

## 2025-01-31 DIAGNOSIS — M25.511 CHRONIC RIGHT SHOULDER PAIN: ICD-10-CM

## 2025-01-31 PROCEDURE — 97140 MANUAL THERAPY 1/> REGIONS: CPT

## 2025-01-31 NOTE — PROGRESS NOTES
Daily Note     Today's date: 2025  Patient name: Therese Iglesias  : 1950  MRN: [id number]  Referring provider: Self, Referral  Dx:   Encounter Diagnosis     ICD-10-CM    1. Lumbar radiculopathy  M54.16       2. Chronic right shoulder pain  M25.511     G89.29                      Subjective: Patient reports no changes in symptoms since starting traction      Objective: See treatment diary below      Assessment: Tolerated treatment well. Initiated coccyx mobilizations externally with coccygeal stretching today. Patient was significantly more limited in R hip ER than L, and so HEP updated to include modified pigeon at home with tape on for coccyx mob into extension. Patient exhibited good technique with therapeutic exercises and would benefit from continued PT to continue with symptom management.       Plan: Continue per plan of care.      Precautions: standard     Past Medical History:   Diagnosis Date    Santos Barr virus infection        Insurance:  [place]/CMS Eval/ Re-eval POC expires FOTO Auth #/ Referral # Total   Visits  Start date  Expiration date Extension  Visit limitation?  PT only or  PT+OT? Co-Insurance   CMS   No auth required     BOMN  PT $0 Co-pay                                                               Date 2025     Visit Number IE 2 3 4 FOTO             Manual         P-A mobs    Coccyx superior pole mobs into ext gr 4     Dry needling with estim    Coccyx taping into extension         Prone hip ER R>L         Med to lat glute fascia /coccygeus pulls x5'         SIJ mobs P-A b/l gr 4     Neuro Re-ed         TrA progression         Hip add squeeze         Clamshells          Bridge          Sciatic nerve sliders                  Thera Ex         Repeated shoulder ext  10       TB shoulder ext         TB shoulder row         Paloff press          Anti-rotation press                                    Thera Activity         Patient education  5'         Lifting mechanics         Posture education                                                      Modalities         Mechanical lumbar traction 30' total  5-55# min-max  6 step ramp up/down 30' total  5-70# min-max  6 step ramp up/down 30' total  5-75# min-max  6 step ramp up/down

## 2025-02-04 ENCOUNTER — OFFICE VISIT (OUTPATIENT)
Dept: PHYSICAL THERAPY | Facility: CLINIC | Age: 75
End: 2025-02-04
Payer: MEDICARE

## 2025-02-04 DIAGNOSIS — M54.16 LUMBAR RADICULOPATHY: Primary | ICD-10-CM

## 2025-02-04 DIAGNOSIS — G89.29 CHRONIC RIGHT SHOULDER PAIN: ICD-10-CM

## 2025-02-04 DIAGNOSIS — M25.511 CHRONIC RIGHT SHOULDER PAIN: ICD-10-CM

## 2025-02-04 PROCEDURE — 97110 THERAPEUTIC EXERCISES: CPT

## 2025-02-04 PROCEDURE — 97140 MANUAL THERAPY 1/> REGIONS: CPT

## 2025-02-04 NOTE — PROGRESS NOTES
Daily Note     Today's date: 2025  Patient name: Therese Iglesias  : 1950  MRN: [id number]  Referring provider: Self, Referral  Dx:   Encounter Diagnosis     ICD-10-CM    1. Lumbar radiculopathy  M54.16       2. Chronic right shoulder pain  M25.511     G89.29                      Subjective: Patient reports that symptoms were better after last session with the manuals.      Objective: See treatment diary below      Assessment: Tolerated treatment well. Continued with traction today per primary PT. Also performed manual tenchiniques to external rotators since patient reported benefit from those last session. Progress per primary PT. Patient exhibited good technique with therapeutic exercises and would benefit from continued PT to continue with symptom management.       Plan: Continue per plan of care.        Precautions: standard     Past Medical History:   Diagnosis Date    Santos Barr virus infection        Insurance:  [place]/CMS Eval/ Re-eval POC expires FOTO Auth #/ Referral # Total   Visits  Start date  Expiration date Extension  Visit limitation?  PT only or  PT+OT? Co-Insurance   CMS   No auth required     BOMN  PT $0 Co-pay                                                               Date 2025    Visit Number IE 2 3 4 FOTO             Manual         P-A mobs    Coccyx superior pole mobs into ext gr 4     Dry needling with estim    Coccyx taping into extension         Prone hip ER R>L Prone hip ER R>L        Med to lat glute fascia /coccygeus pulls x5' Med to lat glute fascia x5'        SIJ mobs P-A b/l gr 4 SIJ mobs P-A b/l gr 4    Neuro Re-ed         TrA progression         Hip add squeeze         Clamshells          Bridge          Sciatic nerve sliders                  Thera Ex         Repeated shoulder ext  10       TB shoulder ext         TB shoulder row         Paloff press          Anti-rotation press                                    Thera Activity          Patient education  5'        Lifting mechanics         Posture education                                                      Modalities         Mechanical lumbar traction 30' total  5-55# min-max  6 step ramp up/down 30' total  5-70# min-max  6 step ramp up/down 30' total  5-75# min-max  6 step ramp up/down  30' total  5-75# min-max  6 step ramp up/down

## 2025-02-07 ENCOUNTER — OFFICE VISIT (OUTPATIENT)
Dept: PHYSICAL THERAPY | Facility: CLINIC | Age: 75
End: 2025-02-07
Payer: MEDICARE

## 2025-02-07 DIAGNOSIS — G89.29 CHRONIC RIGHT SHOULDER PAIN: ICD-10-CM

## 2025-02-07 DIAGNOSIS — M25.511 CHRONIC RIGHT SHOULDER PAIN: ICD-10-CM

## 2025-02-07 DIAGNOSIS — M54.16 LUMBAR RADICULOPATHY: Primary | ICD-10-CM

## 2025-02-07 PROCEDURE — 97140 MANUAL THERAPY 1/> REGIONS: CPT

## 2025-02-07 NOTE — PROGRESS NOTES
Daily Note     Today's date: 2025  Patient name: Therese Iglesias  : 1950  MRN: [id number]  Referring provider: Self, Referral  Dx:   Encounter Diagnosis     ICD-10-CM    1. Lumbar radiculopathy  M54.16       2. Chronic right shoulder pain  M25.511     G89.29           Subjective: Patient reports that [place] going [place] and will be able to better report back with symptom onset/severity.       Objective: See treatment diary below      Assessment: Tolerated treatment well. Continued manual tenchiniques to external rotators including unilateral innominate rotations in sidelying to further provide mobility to her hypomobile lower spine. She tolerated treatment well today with continued TTP at bilateral piriformis area, treated with STM. Progress per primary PT. Patient exhibited good technique with therapeutic exercises and would benefit from continued PT to continue with symptom management.       Plan: Continue per plan of care.        Precautions: standard     Past Medical History:   Diagnosis Date    Santos Barr virus infection        Insurance:  [place]/CMS Eval/ Re-eval POC expires FOTO Auth #/ Referral # Total   Visits  Start date  Expiration date Extension  Visit limitation?  PT only or  PT+OT? Co-Insurance   CMS   No auth required     BOMN  PT $0 Co-pay                                                               Date 2025   Visit Number IE 2 3 4 FOTO 6            Manual         P-A mobs    Coccyx superior pole mobs into ext gr 4  Coccyx superior pole mobs into ext gr 4   Dry needling with estim    Coccyx taping into extension         Prone hip ER R>L Prone hip ER R>L Prone hip ER R>L         Innominate unilateral rotation  X30 each ppt/apt       Med to lat glute fascia /coccygeus pulls x5' Med to lat glute fascia x5' Med to lat glute fascia x5'    Inferior glute traction x5'       SIJ mobs P-A b/l gr 4 SIJ mobs P-A b/l gr 4 SIJ mobs P-A b/l gr 4    Neuro Re-ed         TrA progression         Hip add squeeze         Clamshells          Bridge          Sciatic nerve sliders               Hip CARS x10 each    Thera Ex         Repeated shoulder ext  10       TB shoulder ext         TB shoulder row         Paloff press          Anti-rotation press                                    Thera Activity         Patient education  5'        Lifting mechanics         Posture education                                                      Modalities         Mechanical lumbar traction 30' total  5-55# min-max  6 step ramp up/down 30' total  5-70# min-max  6 step ramp up/down 30' total  5-75# min-max  6 step ramp up/down  30' total  5-75# min-max  6 step ramp up/down

## 2025-02-11 ENCOUNTER — OFFICE VISIT (OUTPATIENT)
Dept: PHYSICAL THERAPY | Facility: CLINIC | Age: 75
End: 2025-02-11
Payer: MEDICARE

## 2025-02-11 DIAGNOSIS — M54.16 LUMBAR RADICULOPATHY: Primary | ICD-10-CM

## 2025-02-11 DIAGNOSIS — G89.29 CHRONIC RIGHT SHOULDER PAIN: ICD-10-CM

## 2025-02-11 DIAGNOSIS — M25.511 CHRONIC RIGHT SHOULDER PAIN: ICD-10-CM

## 2025-02-11 PROCEDURE — 97110 THERAPEUTIC EXERCISES: CPT

## 2025-02-11 PROCEDURE — 97140 MANUAL THERAPY 1/> REGIONS: CPT

## 2025-02-11 NOTE — PROGRESS NOTES
Daily Note     Today's date: 2025  Patient name: Therese Iglesias  : 1950  MRN: [id number]  Referring provider: Self, Referral  Dx:   No diagnosis found.      Subjective: Patient reports that the drive to the [place] went better than expected.       Objective: See treatment diary below      Assessment: Tolerated treatment well. Continued manual tenchiniques to external rotators including unilateral innominate rotations in sidelying to further provide mobility to her hypomobile lower spine. She tolerated treatment well today with continued TTP at bilateral piriformis area, treated with STM. Progress per primary PT. Patient exhibited good technique with therapeutic exercises and would benefit from continued PT to continue with symptom management. Introduced      Plan: Continue per plan of care.        Precautions: standard     Past Medical History:   Diagnosis Date    Santos Barr virus infection        Insurance:  [employer])/CMS Eval/ Re-eval POC expires FOTO Auth #/ Referral # Total   Visits  Start date  Expiration date Extension  Visit limitation?  PT only or  PT+OT? Co-Insurance   CMS   No auth required     BOMN  PT $0 Co-pay                                                               Date 2025   Visit Number IE 2 3 4 FOTO 6              Manual          P-A mobs    Coccyx superior pole mobs into ext gr 4  Coccyx superior pole mobs into ext gr 4    Dry needling with estim    Coccyx taping into extension   TPR glute med/ piriformis       Prone hip ER R>L Prone hip ER R>L Prone hip ER R>L          Innominate unilateral rotation  X30 each ppt/apt Innominate unilateral rotation  X30 each ppt/apt       Med to lat glute fascia /coccygeus pulls x5' Med to lat glute fascia x5' Med to lat glute fascia x5'    Inferior glute traction x5'        SIJ mobs P-A b/l gr 4 SIJ mobs P-A b/l gr 4 SIJ mobs P-A b/l gr 4 SIJ mobs P-A b/l gr 4   Neuro Re-ed          TrA  progression          Hip add squeeze          Clamshells           Bridge           Sciatic nerve sliders                Hip CARS x10 each  Self roll out with lacrosse ball to glutes piriformis in standing and supine   Thera Ex          Repeated shoulder ext  10        TB shoulder ext          TB shoulder row          Paloff press           Anti-rotation press                                        Thera Activity          Patient education  5'         Lifting mechanics          Posture education                                                            Modalities          Mechanical lumbar traction 30' total  5-55# min-max  6 step ramp up/down 30' total  5-70# min-max  6 step ramp up/down 30' total  5-75# min-max  6 step ramp up/down  30' total  5-75# min-max  6 step ramp up/down

## 2025-02-13 ENCOUNTER — OFFICE VISIT (OUTPATIENT)
Dept: PHYSICAL THERAPY | Facility: CLINIC | Age: 75
End: 2025-02-13
Payer: MEDICARE

## 2025-02-13 DIAGNOSIS — G89.29 CHRONIC RIGHT SHOULDER PAIN: ICD-10-CM

## 2025-02-13 DIAGNOSIS — M54.16 LUMBAR RADICULOPATHY: Primary | ICD-10-CM

## 2025-02-13 DIAGNOSIS — M25.511 CHRONIC RIGHT SHOULDER PAIN: ICD-10-CM

## 2025-02-13 PROCEDURE — 97110 THERAPEUTIC EXERCISES: CPT

## 2025-02-13 PROCEDURE — 97140 MANUAL THERAPY 1/> REGIONS: CPT

## 2025-02-13 NOTE — PROGRESS NOTES
Daily Note     Today's date: 2025  Patient name: Therese Iglesias  : 1950  MRN: [id number]  Referring provider: Self, Referral  Dx:   Encounter Diagnosis     ICD-10-CM    1. Lumbar radiculopathy  M54.16       2. Chronic right shoulder pain  M25.511     G89.29           Subjective: Patient reports feeling a bit better sitting in the car.       Objective: See treatment diary below      Assessment: Tolerated treatment well. Continued with unilaterally focused pelvic movements today as patient seems to be benefiting from. R sided LE musculature continues to be more limited/restricted than L. Specifically in iliopsoas/recfem and piriformis. Will continue with this in future visits.   Patient exhibited good technique with therapeutic exercises and would benefit from continued PT to continue with symptom resolution.      Plan: Continue per plan of care.      Precautions: standard     Past Medical History:   Diagnosis Date    Santos Barr virus infection        Insurance:  A/CMS Eval/ Re-eval POC expires FOTO Auth #/ Referral # Total   Visits  Start date  Expiration date Extension  Visit limitation?  PT only or  PT+OT? Co-Insurance   CMS   No auth required     BOMN  PT $0 Co-pay                                                               Date 25    Visit Number 3 4 FOTO 6  8           Re eval?   Manual          P-A mobs  Coccyx superior pole mobs into ext gr 4  Coccyx superior pole mobs into ext gr 4  Coccyx superior pole mobs into ext gr 4    Dry needling with estim  Coccyx taping into extension   TPR glute med/ piriformis TPR glute med/ piriformis      Prone hip ER R>L Prone hip ER R>L Prone hip ER R>L  Prone hip ER R>L        Innominate unilateral rotation  X30 each ppt/apt Innominate unilateral rotation  X30 each ppt/apt Innominate unilateral rotation  X30 each ppt/apt      Med to lat glute fascia /coccygeus pulls x5' Med to lat glute fascia x5' Med to  lat glute fascia x5'    Inferior glute traction x5'  Med to lat glute fascia x5'    Inferior glute traction x5'      SIJ mobs P-A b/l gr 4 SIJ mobs P-A b/l gr 4 SIJ mobs P-A b/l gr 4 SIJ mobs P-A b/l gr 4 SIJ mobs P-A b/l gr 4    Neuro Re-ed          TrA progression          Hip add squeeze          Clamshells           Bridge       Marching bridges 3x10     Sciatic nerve sliders              Hip CARS x10 each  Self roll out with lacrosse ball to glutes piriformis in standing and supine NV pelvic s/l foam roll outs with hip IR/ER    Thera Ex          Repeated shoulder ext      Prone hip ext 3x10     TB shoulder ext          TB shoulder row          Paloff press           Anti-rotation press                                        Thera Activity          Patient education          Lifting mechanics          Posture education                                                            Modalities          Mechanical lumbar traction 30' total  5-75# min-max  6 step ramp up/down  30' total  5-75# min-max  6 step ramp up/down

## 2025-02-14 ENCOUNTER — APPOINTMENT (OUTPATIENT)
Dept: PHYSICAL THERAPY | Facility: CLINIC | Age: 75
End: 2025-02-14
Payer: MEDICARE

## 2025-02-17 ENCOUNTER — APPOINTMENT (OUTPATIENT)
Dept: PHYSICAL THERAPY | Facility: CLINIC | Age: 75
End: 2025-02-17
Payer: MEDICARE

## 2025-02-18 ENCOUNTER — OFFICE VISIT (OUTPATIENT)
Dept: OBGYN CLINIC | Facility: CLINIC | Age: 75
End: 2025-02-18
Payer: MEDICARE

## 2025-02-18 ENCOUNTER — APPOINTMENT (OUTPATIENT)
Dept: PHYSICAL THERAPY | Facility: CLINIC | Age: 75
End: 2025-02-18
Payer: MEDICARE

## 2025-02-18 VITALS — BODY MASS INDEX: 18.2 KG/M2 | WEIGHT: 130 LBS | HEIGHT: 71 IN

## 2025-02-18 DIAGNOSIS — M75.81 ROTATOR CUFF TENDONITIS, RIGHT: Primary | ICD-10-CM

## 2025-02-18 DIAGNOSIS — M75.51 SUBACROMIAL BURSITIS OF RIGHT SHOULDER JOINT: ICD-10-CM

## 2025-02-18 PROCEDURE — 99203 OFFICE O/P NEW LOW 30 MIN: CPT | Performed by: ORTHOPAEDIC SURGERY

## 2025-02-18 PROCEDURE — 20610 DRAIN/INJ JOINT/BURSA W/O US: CPT | Performed by: ORTHOPAEDIC SURGERY

## 2025-02-18 RX ADMIN — DEXAMETHASONE SODIUM PHOSPHATE 40 MG: 10 INJECTION, SOLUTION INTRAMUSCULAR; INTRAVENOUS at 10:00

## 2025-02-18 NOTE — PROGRESS NOTES
Assessment/Plan:  1. Rotator cuff tendonitis, right        2. Subacromial bursitis of right shoulder joint  Large joint arthrocentesis: R subacromial bursa        Scribe Attestation      I,:  Iván Elaine am acting as a scribe while in the presence of the attending physician.:       I,:  Salomón Arrieta, DO personally performed the services described in this documentation    as scribed in my presence.:             Therese and I reviewed her x-rays together.  She has evidence of mild acromioclavicular and glenohumeral osteoarthritis.  She is more symptomatic within the rotator cuff and presents mild weakness secondary to pain.  We discussed treatment options.  I recommended she initiate a physician directed home exercise program to strengthen the rotator cuff.  She did question if a steroid injection is worthy.  We discussed the risk and benefits and she was amenable to proceed.  She tolerated procedure well and postinjection instructions were provided.  She should avoid overhead activities for today and heavy lifting.  Asked that she give the medication up to a week to take full effect.  If she does not show improvement over the next 6 to 8 weeks I would like her to return to see me and I will consider an MRI of the shoulder questioning rotator cuff tear.  All of her patient's questions were answered to her satisfaction.      Large joint arthrocentesis: R subacromial bursa  [facility] Protocol:  procedure performed by consultantConsent: Verbal consent obtained.  Risks and benefits: risks, benefits and alternatives were discussed  Consent given by: patient  Timeout called at: 2/18/2025 10:26 AM.  Patient understanding: patient states understanding of the procedure being performed  Patient consent: the patient's understanding of the procedure matches consent given  Radiology Images displayed and confirmed. If images not available, report reviewed: imaging studies available  Patient identity confirmed: verbally with  patient  Supporting Documentation  Indications: pain   Procedure Details  Location: shoulder - R subacromial bursa  Preparation: Patient was prepped and draped in the usual sterile fashion  Needle size: 22 G  Ultrasound guidance: no  Approach: posterior  Medications administered: 40 mg dexamethasone 100 mg/10 mL (4 mL Bupivicaine .5% injection)    Patient tolerance: patient tolerated the procedure well with no immediate complications  Dressing:  Sterile dressing applied            Subjective:   Therese Iglesias is a 74 y.o. female who presents for evaluation of right shoulder pain that has been ongoing now since September 2024.  Therese is left-hand dominant.  The patient states there is no specific injury to the shoulder.  She has developed a mild to moderate ache that is considered intermittent at the lateral aspect of the shoulder.  She did fracture ribs on the left side which required her to sleep on the right and she is unsure if this is contributing to her pain.  Her pain can be considered throbbing at times and will radiate to the right forearm.  She denies distal paresthesias.  She does not like taking medication for her pain but if the throbbing is persistent enough she will take ibuprofen which seems to help.  She is currently in physical therapy for her lower back.  Her therapist did give her some shoulder exercises to perform though she admits to being inconsistent with this due to a recent illness and vacation.  Therese is questioning if a steroid injection would provide relief.      Review of Systems   Constitutional:  Negative for chills, fever and unexpected weight change.   HENT:  Negative for hearing loss, nosebleeds and sore throat.    Eyes:  Negative for pain, redness and visual disturbance.   Respiratory:  Negative for cough, shortness of breath and wheezing.    Cardiovascular:  Negative for chest pain, palpitations and leg swelling.   Gastrointestinal:  Negative for abdominal pain, nausea and  vomiting.   Endocrine: Negative for polydipsia and polyuria.   Genitourinary:  Negative for dysuria and hematuria.   Musculoskeletal:         See HPI   Skin:  Negative for rash and wound.   Neurological:  Negative for dizziness, numbness and headaches.   Psychiatric/Behavioral:  Negative for decreased concentration and suicidal ideas. The patient is not nervous/anxious.          Past Medical History:   Diagnosis Date    Santos Barr virus infection        Past Surgical History:   Procedure Laterality Date    EYE SURGERY      HERNIA REPAIR      KNEE SURGERY      MO UNLISTED PROCEDURE NERVOUS SYSTEM N/A 10/11/2024    Procedure: BLOCK GANGLION IMPAR;  Surgeon: Fernando Sarmiento MD;  Location: [facility] MAIN OR;  Service: Pain Management     TONSILLECTOMY         Family History   Problem Relation Age of Onset    Alcohol abuse Mother     Alcohol abuse Father     No Known Problems Sister     No Known Problems Sister     No Known Problems Sister     No Known Problems Sister     No Known Problems Daughter     No Known Problems Maternal Grandmother     No Known Problems Maternal Grandfather     No Known Problems Paternal Grandmother     No Known Problems Paternal Grandfather     No Known Problems Paternal Aunt        Social History     Occupational History    Not on file   Tobacco Use    Smoking status: Never     Passive exposure: Never    Smokeless tobacco: Never   Vaping Use    Vaping status: Never Used   Substance and Sexual Activity    Alcohol use: Yes     Comment: rarely    Drug use: Never    Sexual activity: Yes     Partners: Male         Current Outpatient Medications:     clobetasol (TEMOVATE) 0.05 % external solution, if needed, Disp: , Rfl:     gabapentin (NEURONTIN) 300 mg capsule, Take 1 capsule (300 mg total) by mouth 2 (two) times a day, Disp: 180 capsule, Rfl: 1    hydrocortisone 2.5 % cream, if needed, Disp: , Rfl:     meloxicam (MOBIC) 15 mg tablet, Take 1 tablet (15 mg total) by mouth daily as needed for mild  pain or moderate pain for up to 14 days, Disp: 14 tablet, Rfl: 0    tiZANidine (ZANAFLEX) 2 mg tablet, Take 1 tablet (2 mg total) by mouth every 8 (eight) hours as needed for muscle spasms, Disp: 20 tablet, Rfl: 0    No Known Allergies    Objective:  There were no vitals filed for this visit.    Right Shoulder Exam     Tenderness   The patient is experiencing no tenderness.    Range of Motion   Active abduction:  160 (Painful)   External rotation:  80   Forward flexion:  160 (Painful)   Internal rotation 0 degrees:  L1     Muscle Strength   Abduction: 4/5   Internal rotation: 5/5   External rotation: 4/5   Supraspinatus: 4/5   Subscapularis: 5/5     Tests   Cross arm: negative  Impingement: negative    Other   Erythema: absent  Sensation: normal  Pulse: present (2+ radial)    Comments:  Painful arc  Empty can (+)      Left Shoulder Exam     Muscle Strength   Abduction: 5/5   Internal rotation: 5/5   External rotation: 5/5              Physical Exam  Vitals reviewed.   Constitutional:       Appearance: She is well-developed.   HENT:      Head: Normocephalic and atraumatic.   Eyes:      General:         Right eye: No discharge.         Left eye: No discharge.      Conjunctiva/sclera: Conjunctivae normal.   Cardiovascular:      Rate and Rhythm: Regular rhythm.   Pulmonary:      Effort: Pulmonary effort is normal. No respiratory distress.      Breath sounds: No stridor.   Musculoskeletal:      Cervical back: Normal range of motion and neck supple.      Comments: As noted in the HPI.   Skin:     General: Skin is warm and dry.   Neurological:      Mental Status: She is alert and oriented to person, place, and time.   Psychiatric:         Behavior: Behavior normal.         I have personally reviewed pertinent films in PACS and my interpretation is as follows:  X-rays of the right shoulder ordered by Dr. Fernando Sarmiento and performed on September 19, 2024 were reviewed and show evidence of mild acromioclavicular  osteoarthritis.  There is mild glenohumeral osteoarthritis. There is hypertrophic bony formation at the greater tuberosity.  There is no evidence of acute fracture or other osseous abnormality.      This document was created using speech voice recognition software.   Grammatical errors, random word insertions, pronoun errors, and incomplete sentences are an occasional consequence of this system due to software limitations, ambient noise, and hardware issues.   Any formal questions or concerns about content, text, or information contained within the body of this dictation should be directly addressed to the provider for clarification.

## 2025-02-19 RX ORDER — DEXAMETHASONE SODIUM PHOSPHATE 10 MG/ML
40 INJECTION, SOLUTION INTRAMUSCULAR; INTRAVENOUS
Status: COMPLETED | OUTPATIENT
Start: 2025-02-18 | End: 2025-02-18

## 2025-02-20 ENCOUNTER — OFFICE VISIT (OUTPATIENT)
Dept: PHYSICAL THERAPY | Facility: CLINIC | Age: 75
End: 2025-02-20
Payer: MEDICARE

## 2025-02-20 DIAGNOSIS — M25.511 CHRONIC RIGHT SHOULDER PAIN: Primary | ICD-10-CM

## 2025-02-20 DIAGNOSIS — G89.29 CHRONIC RIGHT SHOULDER PAIN: Primary | ICD-10-CM

## 2025-02-20 DIAGNOSIS — M54.16 LUMBAR RADICULOPATHY: ICD-10-CM

## 2025-02-20 PROCEDURE — 97110 THERAPEUTIC EXERCISES: CPT

## 2025-02-20 PROCEDURE — 97140 MANUAL THERAPY 1/> REGIONS: CPT

## 2025-02-20 NOTE — PROGRESS NOTES
Daily Note - DISCHARGE    Today's date: 2025  Patient name: Therese Iglesias  : 1950  MRN: [id number]  Referring provider: Self, Referral  Dx:   Encounter Diagnosis     ICD-10-CM    1. Chronic right shoulder pain  M25.511     G89.29       2. Lumbar radiculopathy  M54.16         Assessment    Patient has completed 2 rounds of physical therapy since last 2024. She has somewhat improved in her tolerance to prolonged sitting, but continues to have this difficulty with abut 30 minutes of sitting. She has been given multiple exercises/stretches, we have exhausted lumbar spine mechanical influences, as well as all strength deficits. She does continue to be hypomobile throughout the SIJ/lumbar spine but this may minimally have to do with her symptoms and be more a product of aging.  Patient would like to take a month off from PT and continue with HEP at home. She was encouraged to reach back out with any other questions/thoughts.     Assessment details: Patient is a 74 y.o. Female who presents to skilled outpatient PT with referring diagnosis of lumbar radiculopathy and chronic right shoulder pain. Primary movement impairment diagnosis of mobility and functional mobility deficits resulting in pathoanatomical symptoms of lumbar spine and B gluteal pain. The aforementioned impairments have limited the patient's ability to sit and drive or greater than an hour. Patient education performed during today's session included: HEP as noted on flow sheet, nature of lumbar radiculopathy, and postural education. Patient verbalized understanding of POC.    Impairments: Abnormal or restricted ROM, Impaired physical strength, Lacks appropriate HEP, and Pain with function  Understanding of Dx/Px/POC: Excellent  Prognosis: Good    Plan  Patient would benefit from: Discharge to HEP    Goals  Short Term Goals (4 weeks):    - Patient will be independent in basic HEP 2-3 weeks    MET  - Patient will have 0/10 pain at  "rest       MET but not consistent  - Patient will demonstrate >1/3 improvement in MMT grade as applicable  MET  - Patient functional goal: Patient will sit for >1 hour with no pain.    ONGOING    Long Term Goals (8 weeks):  - Patient will be independent in a comprehensive home exercise program  ONGOING   - Patient FOTO score will improve by 10 points.      MET  - Patient will self-report >75% improvement in function    MET  - Patient functional goal: Patient will drive >2 hours with no pain.    ONGOING      Subjective- notes that it hurts the most at night watching TV, does sit on a gel cushion.    History of Present Illness  - Updated subjective as of 25: Patient reports increased pain when she is sitting for long periods of time. She also reports increased pain of her R shoulder after she was sleeping on it for long periods of time. Reaching overhead, behind back, laying on her R side, lifting objects of weight.     - Functional limitations: sitting and driving > 45 minutes-1 hour    - Patient goals: \"Sit for a while without pain\"      Pain  - Current pain ratin/10    - At best pain ratin/10  - At worst pain rating: 10/10  - Location: lumbar spine, B glutes   - Alleviating factors: gabapentin- morning and night        Objective   LE MMT- all same and WNL with current RE eval    L Hip Flexion: 5/5  R Hip Flexion: 5/5   L Hip Extension: 5/5 R Hip Extension: 5/5   L Hip Abduction: 5/5 R Hip Abduction: 5/5   L Hip Adduction: 5/5 R Hip Adduction: 5/5   L Knee Extension: 5/5 R Knee Extension: 5/5   L Knee Flexion: 5/5 R Knee Flexion: 5/5   L Ankle DF: 5/5 R Ankle DF: 5/5   L Ankle PF: 5/5  R Ankle PF: 5/5         Movement Loss Pradeep Mod Min Nil Symptoms   Flexion   x  no pain    Extension   x  no pain    Side gliding R    x no pain    Side gliding L    x no pain    R rotation    x no pain    L rotation    x no pain        Sensation  - Light touch: intact     Special Testing- all negative at current re eval  L " R   FABIR negative negative   FADIR negative negative   Hip Scour  negative negative   Thigh thrust negative negative   Flick test  negative negative   Standing flexion test  negative negative   Prone knee flexion test negative negative   Supine to long sit test negative negative   Neurodynamic assessment  Neg Neg      Precautions: standard     Past Medical History:   Diagnosis Date    Santos Barr virus infection        Insurance:  AMA/CMS Eval/ Re-eval POC expires FOTO Auth #/ Referral # Total   Visits  Start date  Expiration date Extension  Visit limitation?  PT only or  PT+OT? Co-Insurance   CMS 6/20 8/1  No auth required     BOMN  PT $0 Co-pay                                                               Date 2/4/25 2/7/25 2/11/257 2/13/25 2/20/25   Visit Number FOTO [phone]  8 [phone]        Re eval   Manual        P-A mobs  Coccyx superior pole mobs into ext gr 4  Coccyx superior pole mobs into ext gr 4 Coccyx superior pole mobs into ext gr 4   Dry needling with estim   TPR glute med/ piriformis TPR glute med/ piriformis TPR glute med/ piriformis    Prone hip ER R>L Prone hip ER R>L  Prone hip ER R>L Prone hip ER R>L     Innominate unilateral rotation  X30 each ppt/apt Innominate unilateral rotation  X30 each ppt/apt Innominate unilateral rotation  X30 each ppt/apt     Med to lat glute fascia x5' Med to lat glute fascia x5'    Inferior glute traction x5'  Med to lat glute fascia x5'    Inferior glute traction x5'     SIJ mobs P-A b/l gr 4 SIJ mobs P-A b/l gr 4 SIJ mobs P-A b/l gr 4 SIJ mobs P-A b/l gr 4    Neuro Re-ed        TrA progression        Hip add squeeze        Clamshells         Bridge     Marching bridges 3x10     Sciatic nerve sliders          Hip CARS x10 each  Self roll out with lacrosse ball to glutes piriformis in standing and supine NV pelvic s/l foam roll outs with hip IR/ER Pilates hip knee to knee foot to foot x30 each    Thera Ex        Repeated shoulder ext    Prone hip ext 3x10  Prone hip ext knee  straight and bent 3x10 each    TB shoulder ext        TB shoulder row        Paloff press         Anti-rotation press                                Thera Activity        Patient education        Lifting mechanics        Posture education                                                Modalities        Mechanical lumbar traction 30' total  5-75# min-max  6 step ramp up/down

## 2025-03-18 ENCOUNTER — HOSPITAL ENCOUNTER (OUTPATIENT)
Dept: RADIOLOGY | Facility: HOSPITAL | Age: 75
Discharge: HOME | End: 2025-03-18
Attending: INTERNAL MEDICINE
Payer: MEDICARE

## 2025-03-18 VITALS — HEIGHT: 71 IN | BODY MASS INDEX: 18.2 KG/M2 | WEIGHT: 130 LBS

## 2025-03-18 DIAGNOSIS — Z12.31 ENCOUNTER FOR SCREENING MAMMOGRAM FOR MALIGNANT NEOPLASM OF BREAST: ICD-10-CM

## 2025-03-18 DIAGNOSIS — Z12.39 SCREENING BREAST EXAMINATION: ICD-10-CM

## 2025-03-18 PROCEDURE — 77067 SCR MAMMO BI INCL CAD: CPT

## 2025-03-18 PROCEDURE — 77063 BREAST TOMOSYNTHESIS BI: CPT

## 2025-03-19 ENCOUNTER — RESULTS FOLLOW-UP (OUTPATIENT)
Dept: FAMILY MEDICINE CLINIC | Facility: CLINIC | Age: 75
End: 2025-03-19

## 2025-04-01 ENCOUNTER — TELEPHONE (OUTPATIENT)
Dept: GASTROENTEROLOGY | Facility: CLINIC | Age: 75
End: 2025-04-01

## 2025-04-01 NOTE — TELEPHONE ENCOUNTER
Pt needs to be scheduled for an Office Visit to discuss a colonoscopy screening due to age (Dr Qureshi pt). I lmom for pt to please call back to schedule with one of our GI providers as Dr Qureshi has retired.  Recall letter mailed.

## 2025-05-01 NOTE — TELEPHONE ENCOUNTER
Orders:  •  Cholecalciferol (Vitamin D3) 50 MCG (2000 UT) capsule; Take 1 capsule (2,000 Units total) by mouth daily  •  CBC and differential; Future  •  Comprehensive metabolic panel; Future  •  Lipid panel; Future  •  TSH, 3rd generation with Free T4 reflex; Future   I called patient and left message that she would need to f/u with Dr Shayy Conti office regarding MRI results since that is who ordered it and she can call back if she has any further questions or concerns for us   Thank you

## 2025-05-12 ENCOUNTER — TELEPHONE (OUTPATIENT)
Age: 75
End: 2025-05-12

## 2025-05-12 NOTE — TELEPHONE ENCOUNTER
There is no available lyme vaccine for humans right now, only dogs.  They are investigating a new lyme vaccine but it is not available yet.

## 2025-05-12 NOTE — TELEPHONE ENCOUNTER
Patient called in and would like to know if Dr. Galvez recommends, the Lyme Vaccine.  She states that she gets Lyme disease, every year and would like to investigate this.      She states that she Googled it and it is available.      Please advise.

## 2025-05-13 NOTE — TELEPHONE ENCOUNTER
Relayed provider's message to patient. Patient expressed understanding and thanks Dr. Galvez for checking this out.  Patient stated please let her know if there are any updates on this in the future.  Thank you!

## 2025-06-03 ENCOUNTER — TELEPHONE (OUTPATIENT)
Age: 75
End: 2025-06-03

## 2025-06-03 NOTE — TELEPHONE ENCOUNTER
Caller: Therese    Doctor: Dr. Arrieta    Reason for call: Patient was in back in Feb for Right shoulder pain, she received an injection but it didn't really help. She was wondering if you would order her an MRI for the Right shoulder,     Call back#: [phone]   Monitor for increased risk corneal edema.

## 2025-06-03 NOTE — TELEPHONE ENCOUNTER
Called and left VM for patient advising a FU apt to document failure of conservative treatment in office note to submit to insurance for MRI auth. ADvised to call back to schedule FU apt with Dr. Arrieta

## 2025-06-04 ENCOUNTER — APPOINTMENT (OUTPATIENT)
Dept: LAB | Facility: CLINIC | Age: 75
End: 2025-06-04
Attending: INTERNAL MEDICINE
Payer: MEDICARE

## 2025-06-04 ENCOUNTER — OFFICE VISIT (OUTPATIENT)
Dept: PAIN MEDICINE | Facility: CLINIC | Age: 75
End: 2025-06-04
Payer: MEDICARE

## 2025-06-04 ENCOUNTER — OFFICE VISIT (OUTPATIENT)
Dept: FAMILY MEDICINE CLINIC | Facility: CLINIC | Age: 75
End: 2025-06-04
Payer: MEDICARE

## 2025-06-04 VITALS
OXYGEN SATURATION: 97 % | BODY MASS INDEX: 18.67 KG/M2 | HEART RATE: 87 BPM | DIASTOLIC BLOOD PRESSURE: 88 MMHG | TEMPERATURE: 97.5 F | SYSTOLIC BLOOD PRESSURE: 120 MMHG | RESPIRATION RATE: 16 BRPM | WEIGHT: 132 LBS

## 2025-06-04 DIAGNOSIS — M46.1 SACROILIITIS (HCC): Primary | ICD-10-CM

## 2025-06-04 DIAGNOSIS — G57.01 PIRIFORMIS SYNDROME, RIGHT: ICD-10-CM

## 2025-06-04 DIAGNOSIS — M25.50 ARTHRALGIA, UNSPECIFIED JOINT: Primary | ICD-10-CM

## 2025-06-04 DIAGNOSIS — M25.50 ARTHRALGIA, UNSPECIFIED JOINT: ICD-10-CM

## 2025-06-04 DIAGNOSIS — G57.02 PIRIFORMIS SYNDROME OF LEFT SIDE: ICD-10-CM

## 2025-06-04 PROCEDURE — 86618 LYME DISEASE ANTIBODY: CPT

## 2025-06-04 PROCEDURE — G2211 COMPLEX E/M VISIT ADD ON: HCPCS | Performed by: STUDENT IN AN ORGANIZED HEALTH CARE EDUCATION/TRAINING PROGRAM

## 2025-06-04 PROCEDURE — 36415 COLL VENOUS BLD VENIPUNCTURE: CPT

## 2025-06-04 PROCEDURE — 99213 OFFICE O/P EST LOW 20 MIN: CPT | Performed by: INTERNAL MEDICINE

## 2025-06-04 PROCEDURE — 99214 OFFICE O/P EST MOD 30 MIN: CPT | Performed by: STUDENT IN AN ORGANIZED HEALTH CARE EDUCATION/TRAINING PROGRAM

## 2025-06-04 PROCEDURE — G2211 COMPLEX E/M VISIT ADD ON: HCPCS | Performed by: INTERNAL MEDICINE

## 2025-06-04 NOTE — H&P (VIEW-ONLY)
Pain Medicine Follow-Up Note    Assessment:  1. Sacroiliitis (HCC)    2. Piriformis syndrome, right    3. Piriformis syndrome of left side      Patient is a pleasant 74-year-old woman who presents as a follow-up visit after last being seen on 9/9/2024. At that time patient seen for symptoms of coccydynia and was scheduled for ganglion impar injection.  Unfortunately patient reports no benefit from injection therapy states her symptoms are the same rating pain to 8 out of 10 on a pain scale.  Her pain is intermittent in quality pain is a burning sensation primarily in her bilateral buttocks area with pain with sitting.  Of note patient did trial sacroiliac joint injections in the past.  She has not done any recent conservative treatment.  Patient has prescribed gabapentin 300 mg 3 times daily by her PCP.    On examination patient with tenderness in the bilateral buttocks with pain with sitting.  Patient also with tenderness to palpation over the bilateral SI joints.  Patient symptoms likely secondary to sacroiliitis along with piriformis syndrome.  Given this would like to schedule patient for right followed by left SI joint and piriformis injection under fluoroscopic guidance.  Patient counseled risk and benefits of injection therapy elected proceed.  Discussed with patient that if she receives no benefit from these injections we can consider diagnostic S1-S3 diagnostic block to RFA pathway.  Plan:  Schedule for right followed by left SI joint and piriformis injection  If no benefit, consider S1-S3 diagnostic block to RFA pathway  Continue with PT and chiropractic care  No orders of the defined types were placed in this encounter.      No orders of the defined types were placed in this encounter.      My impressions and treatment recommendations were discussed in detail with the patient who verbalized understanding and had no further questions.        Complete risks and benefits including bleeding, infection, tissue  reaction, nerve injury and allergic reaction were discussed. The approach was demonstrated using models and literature was provided. Verbal and written consent was obtained.      Follow-up is planned in four weeks time or sooner as warranted.  Discharge instructions were provided. I personally saw and examined the patient and I agree with the above discussed plan of care.    History of Present Illness:    Therese Iglesias is a 74 y.o. female who presents to [facility] for interval re-evaluation of the above stated pain complaints. The patient has a past medical and chronic pain history as outlined in the assessment section. She was last seen on 9/9/2024.    Patient is a pleasant 74-year-old woman who presents as a follow-up visit after last being seen on 9/9/2024. At that time patient seen for symptoms of coccydynia and was scheduled for ganglion impar injection.  Unfortunately patient reports no benefit from injection therapy states her symptoms are the same rating pain to 8 out of 10 on a pain scale.  Her pain is intermittent in quality pain is a burning sensation primarily in her bilateral buttocks area with pain with sitting.  Of note patient did trial sacroiliac joint injections in the past.  She has not done any recent conservative treatment.  Patient has prescribed gabapentin 300 mg 3 times daily by her PCP.      Other than as stated above, the patient denies any interval changes in medications, medical condition, mental condition, symptoms, or allergies since the last office visit.         Review of Systems:    Review of Systems   Constitutional:  Negative for unexpected weight change.   HENT:  Negative for ear pain.    Eyes:  Negative for visual disturbance.   Respiratory:  Negative for shortness of breath and wheezing.    Gastrointestinal:  Negative for abdominal pain.   Musculoskeletal:  Negative for back pain.        Decreased ROM, joint and muscle pain   Neurological:  Negative  for weakness and numbness.   Psychiatric/Behavioral:  Positive for sleep disturbance. Negative for decreased concentration.          Past Medical History[1]    Past Surgical History[2]    Family History[3]    Social History     Occupational History   • Not on file   Tobacco Use   • Smoking status: Never     Passive exposure: Never   • Smokeless tobacco: Never   Vaping Use   • Vaping status: Never Used   Substance and Sexual Activity   • Alcohol use: Yes     Comment: rarely   • Drug use: Never   • Sexual activity: Yes     Partners: Male       Current Medications[4]    Allergies[5]    Physical Exam:    There were no vitals taken for this visit.    Constitutional:normal, well developed, well nourished, alert, in no distress and non-toxic and no overt pain behavior.  Eyes:anicteric  HEENT:grossly intact  Neck:supple, symmetric, trachea midline and no masses   Pulmonary:even and unlabored  Cardiovascular:No edema or pitting edema present  Skin:Normal without rashes or lesions and well hydrated  Psychiatric:Mood and affect appropriate  Neurologic:Cranial Nerves II-XII grossly intact  Musculoskeletal:normal gait. TTP over bilateral SI joint and bilateral buttocks. +thigh thrust. +Mukul's finger.       Imaging  SACRUM AND COCCYX     INDICATION:   Sacrococcygeal disorders, not elsewhere classified.      COMPARISON:  None.     VIEWS:  XR SACRUM AND COCCYX   Images: 3     FINDINGS:     There is no evidence of an acute fracture.     Sacral arcuate lines are maintained.     The sacroiliac joints appear symmetric.     Pubic symphysis is maintained.     Degenerative changes visualized lower lumbar spine. Severe right and moderate left hip osteoarthritis.     IMPRESSION:        No acute osseous abnormality.     Lumbar spondylosis and hip osteoarthritis.  No orders to display         No orders of the defined types were placed in this encounter.           [1]  Past Medical History:  Diagnosis Date   • Santos Barr virus infection     [2]  Past Surgical History:  Procedure Laterality Date   • EYE SURGERY     • HERNIA REPAIR     • KNEE SURGERY     • [place] UNLISTED PROCEDURE NERVOUS SYSTEM N/A 10/11/2024    Procedure: BLOCK GANGLION IMPAR;  Surgeon: Feranndo Sarmiento MD;  Location: [facility] MAIN OR;  Service: Pain Management    • TONSILLECTOMY     [3]  Family History  Problem Relation Name Age of Onset   • Alcohol abuse Mother     • Alcohol abuse Father     • No Known Problems Sister     • No Known Problems Sister     • No Known Problems Sister     • No Known Problems Sister     • No Known Problems Daughter     • No Known Problems Maternal Grandmother     • No Known Problems Maternal Grandfather     • No Known Problems Paternal Grandmother     • No Known Problems Paternal Grandfather     • No Known Problems Paternal Aunt     [4]    Current Outpatient Medications:   •  clobetasol (TEMOVATE) 0.05 % external solution, if needed, Disp: , Rfl:   •  gabapentin (NEURONTIN) 300 mg capsule, Take 1 capsule (300 mg total) by mouth 2 (two) times a day, Disp: 180 capsule, Rfl: 1  •  hydrocortisone 2.5 % cream, if needed, Disp: , Rfl:   •  meloxicam (MOBIC) 15 mg tablet, Take 1 tablet (15 mg total) by mouth daily as needed for mild pain or moderate pain for up to 14 days, Disp: 14 tablet, Rfl: 0[5]  No Known Allergies

## 2025-06-04 NOTE — PROGRESS NOTES
Pain Medicine Follow-Up Note    Assessment:  1. Sacroiliitis (HCC)    2. Piriformis syndrome, right    3. Piriformis syndrome of left side      Patient is a pleasant 74-year-old woman who presents as a follow-up visit after last being seen on 9/9/2024. At that time patient seen for symptoms of coccydynia and was scheduled for ganglion impar injection.  Unfortunately patient reports no benefit from injection therapy states her symptoms are the same rating pain to 8 out of 10 on a pain scale.  Her pain is intermittent in quality pain is a burning sensation primarily in her bilateral buttocks area with pain with sitting.  Of note patient did trial sacroiliac joint injections in the past.  She has not done any recent conservative treatment.  Patient has prescribed gabapentin 300 mg 3 times daily by her PCP.    On examination patient with tenderness in the bilateral buttocks with pain with sitting.  Patient also with tenderness to palpation over the bilateral SI joints.  Patient symptoms likely secondary to sacroiliitis along with piriformis syndrome.  Given this would like to schedule patient for right followed by left SI joint and piriformis injection under fluoroscopic guidance.  Patient counseled risk and benefits of injection therapy elected proceed.  Discussed with patient that if she receives no benefit from these injections we can consider diagnostic S1-S3 diagnostic block to RFA pathway.  Plan:  Schedule for right followed by left SI joint and piriformis injection  If no benefit, consider S1-S3 diagnostic block to RFA pathway  Continue with PT and chiropractic care  No orders of the defined types were placed in this encounter.      No orders of the defined types were placed in this encounter.      My impressions and treatment recommendations were discussed in detail with the patient who verbalized understanding and had no further questions.        Complete risks and benefits including bleeding, infection, tissue  reaction, nerve injury and allergic reaction were discussed. The approach was demonstrated using models and literature was provided. Verbal and written consent was obtained.      Follow-up is planned in four weeks time or sooner as warranted.  Discharge instructions were provided. I personally saw and examined the patient and I agree with the above discussed plan of care.    History of Present Illness:    Therese Iglesias is a 74 y.o. female who presents to [facility] for interval re-evaluation of the above stated pain complaints. The patient has a past medical and chronic pain history as outlined in the assessment section. She was last seen on 9/9/2024.    Patient is a pleasant 74-year-old woman who presents as a follow-up visit after last being seen on 9/9/2024. At that time patient seen for symptoms of coccydynia and was scheduled for ganglion impar injection.  Unfortunately patient reports no benefit from injection therapy states her symptoms are the same rating pain to 8 out of 10 on a pain scale.  Her pain is intermittent in quality pain is a burning sensation primarily in her bilateral buttocks area with pain with sitting.  Of note patient did trial sacroiliac joint injections in the past.  She has not done any recent conservative treatment.  Patient has prescribed gabapentin 300 mg 3 times daily by her PCP.      Other than as stated above, the patient denies any interval changes in medications, medical condition, mental condition, symptoms, or allergies since the last office visit.         Review of Systems:    Review of Systems   Constitutional:  Negative for unexpected weight change.   HENT:  Negative for ear pain.    Eyes:  Negative for visual disturbance.   Respiratory:  Negative for shortness of breath and wheezing.    Gastrointestinal:  Negative for abdominal pain.   Musculoskeletal:  Negative for back pain.        Decreased ROM, joint and muscle pain   Neurological:  Negative  for weakness and numbness.   Psychiatric/Behavioral:  Positive for sleep disturbance. Negative for decreased concentration.          Past Medical History[1]    Past Surgical History[2]    Family History[3]    Social History     Occupational History   • Not on file   Tobacco Use   • Smoking status: Never     Passive exposure: Never   • Smokeless tobacco: Never   Vaping Use   • Vaping status: Never Used   Substance and Sexual Activity   • Alcohol use: Yes     Comment: rarely   • Drug use: Never   • Sexual activity: Yes     Partners: Male       Current Medications[4]    Allergies[5]    Physical Exam:    There were no vitals taken for this visit.    Constitutional:normal, well developed, well nourished, alert, in no distress and non-toxic and no overt pain behavior.  Eyes:anicteric  HEENT:grossly intact  Neck:supple, symmetric, trachea midline and no masses   Pulmonary:even and unlabored  Cardiovascular:No edema or pitting edema present  Skin:Normal without rashes or lesions and well hydrated  Psychiatric:Mood and affect appropriate  Neurologic:Cranial Nerves II-XII grossly intact  Musculoskeletal:normal gait. TTP over bilateral SI joint and bilateral buttocks. +thigh thrust. +Mukul's finger.       Imaging  SACRUM AND COCCYX     INDICATION:   Sacrococcygeal disorders, not elsewhere classified.      COMPARISON:  None.     VIEWS:  XR SACRUM AND COCCYX   Images: 3     FINDINGS:     There is no evidence of an acute fracture.     Sacral arcuate lines are maintained.     The sacroiliac joints appear symmetric.     Pubic symphysis is maintained.     Degenerative changes visualized lower lumbar spine. Severe right and moderate left hip osteoarthritis.     IMPRESSION:        No acute osseous abnormality.     Lumbar spondylosis and hip osteoarthritis.  No orders to display         No orders of the defined types were placed in this encounter.           [1]  Past Medical History:  Diagnosis Date   • Santos Barr virus infection     [2]  Past Surgical History:  Procedure Laterality Date   • EYE SURGERY     • HERNIA REPAIR     • KNEE SURGERY     • [place] UNLISTED PROCEDURE NERVOUS SYSTEM N/A 10/11/2024    Procedure: BLOCK GANGLION IMPAR;  Surgeon: Fernando Sarmiento MD;  Location: [facility] MAIN OR;  Service: Pain Management    • TONSILLECTOMY     [3]  Family History  Problem Relation Name Age of Onset   • Alcohol abuse Mother     • Alcohol abuse Father     • No Known Problems Sister     • No Known Problems Sister     • No Known Problems Sister     • No Known Problems Sister     • No Known Problems Daughter     • No Known Problems Maternal Grandmother     • No Known Problems Maternal Grandfather     • No Known Problems Paternal Grandmother     • No Known Problems Paternal Grandfather     • No Known Problems Paternal Aunt     [4]    Current Outpatient Medications:   •  clobetasol (TEMOVATE) 0.05 % external solution, if needed, Disp: , Rfl:   •  gabapentin (NEURONTIN) 300 mg capsule, Take 1 capsule (300 mg total) by mouth 2 (two) times a day, Disp: 180 capsule, Rfl: 1  •  hydrocortisone 2.5 % cream, if needed, Disp: , Rfl:   •  meloxicam (MOBIC) 15 mg tablet, Take 1 tablet (15 mg total) by mouth daily as needed for mild pain or moderate pain for up to 14 days, Disp: 14 tablet, Rfl: 0[5]  No Known Allergies

## 2025-06-04 NOTE — PROGRESS NOTES
"Name: Therese Iglesias      : 1950      MRN: [id number]  Encounter Provider: Karina Galvez MD  Encounter Date: 2025   Encounter department: [facility]  :  Assessment & Plan  Arthralgia, unspecified joint  Will check lyme titer, follow up after results.   Orders:  •  Lyme Total AB W Reflex to IGM/IGG; Future           History of Present Illness   For about a week and a half has had bone pain \"everywhere\" and feels very fatigued.  She is worried that she may have a resurgence of lyme.  This is exactly how she felt the last time she had this.  There are no fevers or rash.  She has not noticed any tick bites.       Review of Systems   Constitutional: Negative.    Respiratory: Negative.     Cardiovascular: Negative.    Musculoskeletal:  Positive for arthralgias.   Skin:  Negative for rash.       Objective   /88   Pulse 87   Temp 97.5 °F (36.4 °C)   Resp 16   Wt 59.9 kg (132 lb)   SpO2 97%   BMI 18.67 kg/m²      Physical Exam  Constitutional:       Appearance: Normal appearance.   HENT:      Head: Normocephalic and atraumatic.     Eyes:      Pupils: Pupils are equal, round, and reactive to light.       Cardiovascular:      Rate and Rhythm: Normal rate and regular rhythm.      Heart sounds: No murmur heard.     No friction rub. No gallop.   Pulmonary:      Effort: Pulmonary effort is normal.      Breath sounds: Normal breath sounds. No wheezing or rales.   Abdominal:      General: Abdomen is flat. Bowel sounds are normal.      Palpations: Abdomen is soft.      Tenderness: There is no abdominal tenderness.     Musculoskeletal:         General: No swelling.     Neurological:      Mental Status: She is alert.         "

## 2025-06-05 ENCOUNTER — RESULTS FOLLOW-UP (OUTPATIENT)
Dept: FAMILY MEDICINE CLINIC | Facility: CLINIC | Age: 75
End: 2025-06-05

## 2025-06-05 LAB — B BURGDOR IGG+IGM SER QL IA: NEGATIVE

## 2025-06-05 NOTE — TELEPHONE ENCOUNTER
Patient called asking if Dr Collins would consider prescribing an antibiotic anyway. States she gets Lyme symptoms all the time since having it in the past . Also an antibiotic that can be in the sun , she takes care of her grandchild and is outside often. Uses [facility]

## 2025-06-06 NOTE — RESULT ENCOUNTER NOTE
Left Pt. A voicemail to call back, when pt calls back please relay the provider message-[facility]

## 2025-06-12 ENCOUNTER — OFFICE VISIT (OUTPATIENT)
Dept: OBGYN CLINIC | Facility: CLINIC | Age: 75
End: 2025-06-12
Payer: MEDICARE

## 2025-06-12 VITALS — HEIGHT: 71 IN | BODY MASS INDEX: 18.48 KG/M2 | WEIGHT: 132 LBS

## 2025-06-12 DIAGNOSIS — M24.811 INTERNAL DERANGEMENT OF RIGHT SHOULDER: Primary | ICD-10-CM

## 2025-06-12 PROCEDURE — 99213 OFFICE O/P EST LOW 20 MIN: CPT | Performed by: ORTHOPAEDIC SURGERY

## 2025-06-12 NOTE — PROGRESS NOTES
Patient Name: Therese Iglesias      : 1950       MRN: [id number]   Encounter Provider: Salomón Arrieta DO   Encounter Date: 25  Encounter department: [facility]         Assessment & Plan  Internal derangement of right shoulder  Therese has ongoing right shoulder pain and increased weakness in the right shoulder.  A physician directed home exercise program and steroid injection failed to provide any relief.  I would like her to have an MRI of the right shoulder questioning rotator cuff tear versus tendinitis.  Should a rotator cuff tear be found she may require surgical intervention in the form of a right shoulder arthroscopy with rotator cuff repair.  If rotator cuff tendinitis or bursitis is found we will continue with nonoperative care.  The data from the MRI will be extremely valuable in assisting me in formulating an appropriate plan of care.  She can continue with Tylenol for pain control.  She can consider introducing ibuprofen as well.  All of her questions were answered to her satisfaction.  I will see her back once the MRI has been completed and a report provided by radiology.  Orders:    MRI shoulder right wo contrast; Future          Follow-up:  No follow-ups on file.     _____________________________________________________  CHIEF COMPLAINT:  Chief Complaint   Patient presents with    Right Shoulder - Follow-up         SUBJECTIVE:  Therese Iglesias is a 74 y.o. female who presents for follow-up evaluation of chronic right shoulder pain.  At last visit she was diagnosed with mild acromioclavicular and glenohumeral osteoarthritis and rotator cuff tendinitis.  She was provided a physician directed home exercise program and received a subacromial steroid injection.  Unfortunately she found no relief from the injection and the exercises made her shoulder more painful.  She returns today complaining of activity related right shoulder pain.  In the mornings simply  "lifting a couple coffee is painful for her.  She states the right shoulder is becoming more and more weak.  The pain is located at the anterior and lateral aspect of the shoulder and does not radiate.  The pain is worse at night following an active day.  She will take 2 Tylenol in the evenings for pain management.  She denies new injury to the shoulder.  She denies distal paresthesias.    PAST MEDICAL HISTORY:  Past Medical History[1]    PAST SURGICAL HISTORY:  Past Surgical History[2]    FAMILY HISTORY:  Family History[3]    SOCIAL HISTORY:  Social History[4]    MEDICATIONS:  Current Medications[5]    ALLERGIES:  Allergies[6]    LABS:  HgA1c: No results found for: \"HGBA1C\"  BMP:   Lab Results   Component Value Date    CALCIUM 9.4 07/08/2024    K 4.5 07/08/2024    CO2 30 07/08/2024     07/08/2024    BUN 24 07/08/2024    CREATININE 0.94 07/08/2024     CBC: No components found for: \"CBC\"    _____________________________________________________  Review of Systems   Constitutional:  Negative for chills, fever and unexpected weight change.   HENT:  Negative for hearing loss, nosebleeds and sore throat.    Eyes:  Negative for pain, redness and visual disturbance.   Respiratory:  Negative for cough, shortness of breath and wheezing.    Cardiovascular:  Negative for chest pain, palpitations and leg swelling.   Gastrointestinal:  Negative for abdominal pain, nausea and vomiting.   Endocrine: Negative for polydipsia and polyuria.   Genitourinary:  Negative for dysuria and hematuria.   Musculoskeletal:         See HPI   Skin:  Negative for rash and wound.   Neurological:  Negative for dizziness, numbness and headaches.   Psychiatric/Behavioral:  Negative for decreased concentration and suicidal ideas. The patient is not nervous/anxious.         Right Shoulder Exam     Range of Motion   Active abduction:  160   External rotation:  70   Forward flexion:  170   Internal rotation 0 degrees:  L1     Muscle Strength "   Abduction: 3/5   Internal rotation: 5/5   External rotation: 5/5   Supraspinatus: 3/5   Subscapularis: 5/5     Tests   Palmer test: negative    Other   Erythema: absent  Scars: absent  Sensation: normal  Pulse: present (2+ radial)             Physical Exam  Vitals reviewed.   Constitutional:       Appearance: She is well-developed.   HENT:      Head: Normocephalic and atraumatic.     Eyes:      General:         Right eye: No discharge.         Left eye: No discharge.      Conjunctiva/sclera: Conjunctivae normal.       Cardiovascular:      Rate and Rhythm: Regular rhythm.   Pulmonary:      Effort: Pulmonary effort is normal. No respiratory distress.      Breath sounds: No stridor.     Musculoskeletal:      Cervical back: Normal range of motion and neck supple.      Comments: As noted in the HPI     Skin:     General: Skin is warm and dry.     Neurological:      Mental Status: She is alert and oriented to person, place, and time.     Psychiatric:         Behavior: Behavior normal.        _____________________________________________________  STUDIES REVIEWED:  I personally reviewed the pertinent images and my independent interpretation is as follows:  X-rays from February 2025 were reviewed once again show evidence of mild acromioclavicular and glenohumeral joint osteoarthritis.    PROCEDURES PERFORMED:  No procedures performed today.    Scribe Attestation      I,:  Iván Elaine am acting as a scribe while in the presence of the attending physician.:       I,:  Salomón Arrieta DO personally performed the services described in this documentation    as scribed in my presence.:                   [1]   Past Medical History:  Diagnosis Date    Santos Barr virus infection    [2]   Past Surgical History:  Procedure Laterality Date    EYE SURGERY      HERNIA REPAIR      KNEE SURGERY      [place] UNLISTED PROCEDURE NERVOUS SYSTEM N/A 10/11/2024    Procedure: BLOCK GANGLION IMPAR;  Surgeon: Fernando Sarmiento MD;  Location: [place]  [facility] MAIN [facility];  Service: Pain Management     TONSILLECTOMY     [3]   Family History  Problem Relation Name Age of Onset    Alcohol abuse Mother      Alcohol abuse Father      No Known Problems Sister      No Known Problems Sister      No Known Problems Sister      No Known Problems Sister      No Known Problems Daughter      No Known Problems Maternal Grandmother      No Known Problems Maternal Grandfather      No Known Problems Paternal Grandmother      No Known Problems Paternal Grandfather      No Known Problems Paternal Aunt     [4]   Social History  Tobacco Use    Smoking status: Never     Passive exposure: Never    Smokeless tobacco: Never   Vaping Use    Vaping status: Never Used   Substance Use Topics    Alcohol use: Yes     Comment: rarely    Drug use: Never   [5]   Current Outpatient Medications:     clobetasol (TEMOVATE) 0.05 % external solution, if needed, Disp: , Rfl:     gabapentin (NEURONTIN) 300 mg capsule, Take 1 capsule (300 mg total) by mouth 2 (two) times a day, Disp: 180 capsule, Rfl: 1    hydrocortisone 2.5 % cream, if needed, Disp: , Rfl:     meloxicam (MOBIC) 15 mg tablet, Take 1 tablet (15 mg total) by mouth daily as needed for mild pain or moderate pain for up to 14 days, Disp: 14 tablet, Rfl: 0  [6] No Known Allergies

## 2025-06-25 ENCOUNTER — HOSPITAL ENCOUNTER (OUTPATIENT)
Dept: RADIOLOGY | Facility: HOSPITAL | Age: 75
Discharge: HOME | End: 2025-06-25
Attending: ORTHOPAEDIC SURGERY
Payer: MEDICARE

## 2025-06-25 DIAGNOSIS — M24.811 INTERNAL DERANGEMENT OF RIGHT SHOULDER: ICD-10-CM

## 2025-06-25 PROCEDURE — 73221 MRI JOINT UPR EXTREM W/O DYE: CPT

## 2025-06-26 ENCOUNTER — NURSE TRIAGE (OUTPATIENT)
Dept: OTHER | Facility: OTHER | Age: 75
End: 2025-06-26

## 2025-06-27 ENCOUNTER — HOSPITAL ENCOUNTER (OUTPATIENT)
Facility: AMBULARY SURGERY CENTER | Age: 75
Setting detail: OUTPATIENT SURGERY
Discharge: HOME | End: 2025-06-27
Attending: STUDENT IN AN ORGANIZED HEALTH CARE EDUCATION/TRAINING PROGRAM | Admitting: STUDENT IN AN ORGANIZED HEALTH CARE EDUCATION/TRAINING PROGRAM
Payer: MEDICARE

## 2025-06-27 ENCOUNTER — APPOINTMENT (OUTPATIENT)
Dept: RADIOLOGY | Facility: HOSPITAL | Age: 75
End: 2025-06-27
Payer: MEDICARE

## 2025-06-27 VITALS
DIASTOLIC BLOOD PRESSURE: 92 MMHG | SYSTOLIC BLOOD PRESSURE: 176 MMHG | OXYGEN SATURATION: 98 % | HEART RATE: 101 BPM | TEMPERATURE: 97.4 F | RESPIRATION RATE: 18 BRPM

## 2025-06-27 PROBLEM — M46.1 SACROILIITIS (HCC): Status: ACTIVE | Noted: 2025-06-27

## 2025-06-27 PROBLEM — M79.18 MYOFASCIAL PAIN: Status: ACTIVE | Noted: 2025-06-27

## 2025-06-27 PROCEDURE — 77002 NEEDLE LOCALIZATION BY XRAY: CPT | Performed by: STUDENT IN AN ORGANIZED HEALTH CARE EDUCATION/TRAINING PROGRAM

## 2025-06-27 PROCEDURE — NC001 PR NO CHARGE: Performed by: STUDENT IN AN ORGANIZED HEALTH CARE EDUCATION/TRAINING PROGRAM

## 2025-06-27 PROCEDURE — 20552 NJX 1/MLT TRIGGER POINT 1/2: CPT | Performed by: STUDENT IN AN ORGANIZED HEALTH CARE EDUCATION/TRAINING PROGRAM

## 2025-06-27 PROCEDURE — 27096 INJECT SACROILIAC JOINT: CPT | Performed by: STUDENT IN AN ORGANIZED HEALTH CARE EDUCATION/TRAINING PROGRAM

## 2025-06-27 RX ORDER — METHYLPREDNISOLONE ACETATE 80 MG/ML
INJECTION, SUSPENSION INTRA-ARTICULAR; INTRALESIONAL; INTRAMUSCULAR; SOFT TISSUE AS NEEDED
Status: DISCONTINUED | OUTPATIENT
Start: 2025-06-27 | End: 2025-06-27 | Stop reason: HOSPADM

## 2025-06-27 RX ORDER — LIDOCAINE HYDROCHLORIDE 10 MG/ML
INJECTION, SOLUTION EPIDURAL; INFILTRATION; INTRACAUDAL; PERINEURAL AS NEEDED
Status: DISCONTINUED | OUTPATIENT
Start: 2025-06-27 | End: 2025-06-27 | Stop reason: HOSPADM

## 2025-06-27 RX ORDER — ROPIVACAINE HYDROCHLORIDE 2 MG/ML
INJECTION, SOLUTION EPIDURAL; INFILTRATION; PERINEURAL AS NEEDED
Status: DISCONTINUED | OUTPATIENT
Start: 2025-06-27 | End: 2025-06-27 | Stop reason: HOSPADM

## 2025-06-27 NOTE — TELEPHONE ENCOUNTER
S/P said she did speak w/ someone last night.  I did just confirm with pt, arrival time of 9:50 and she will need a .

## 2025-06-27 NOTE — DISCHARGE INSTRUCTIONS

## 2025-06-27 NOTE — OP NOTE
Pre-procedure Diagnosis: Sacroiliitis & Piriformis syndrome  Post-procedure Diagnosis: Sacroiliitis & Piriformis syndrome  Operation Title(s):  1. rightSacroiliac joint injection and piriformis injection      2. Intraoperative fluoroscopy  Attending Surgeon:   Fernando Sarmiento MD  Anesthesia:   Local    Indications: The patient is a 50 y.o. year-old female with a diagnosis of sacroiliitis. The patient's history and physical exam were reviewed. The risks, benefits and alternatives to the procedure were discussed, and all questions were answered to the patient's satisfaction. The patient agreed to proceed, and written informed consent was obtained.    Procedure in Detail: The patient was brought into the procedure room and placed in the prone position on the fluoroscopy table. The low back and upper buttock were prepped with chloraprep and draped in a sterile manner.     AP fluoroscopy was used to visualize the rightsacroiliac joint. The fluoroscopic beam was then obliqued until the anterior and posterior margins of the joint were aligned. The inferior margin of the joint was identified and marked. The skin and subcutaneous tissues in the area were anesthetized with 1% lidocaine. A 22-gauge, 3½-inch spinal needle was advanced toward the identified point under fluoroscopic guidance. Once the targeted point was reached and the joint space was entered, negative aspiration was confirmed, and 1ml of contrast was injected. The joint space was appropriately outlined.    Then, after negative aspiration, a solution consisting of 2 mL0.2% ropivacaine and 0.5 mL Depo-Medrol (80mg/ml) were easily injected.     The needle was then redirected into the right piriformis muscle. After appropriate contrast spread, a solution consisting of 2-ml of 0.2% ropivacaine and 0.5 ml of Depomedrol (80mg/ml) was easily injected.     The patient's back was cleaned and a bandage was placed over the sites of needle insertion.    Disposition: The  patient tolerated the procedure well and there were no apparent complications. Vital signs remained stable throughout the procedure. The patient was taken to the recovery area where written discharge instructions for the procedure were given.    Estimated Blood Loss: None  Specimens Obtained: N/A

## 2025-06-27 NOTE — TELEPHONE ENCOUNTER
"Regarding: procedure tomorrow /  needed?  ----- Message from Anna MCKEON sent at 6/26/2025  9:22 PM EDT -----  \"I have a procedure tomorrow and someone was supposed to call me with the time but hasn't. Also do I need a  or can I drive myself\"    I provided time on appt desk of 10:50 w/recommended arrival 1 hr prior--pt unsure if anesthesia involved and if her  has to drive her, she has some pp work but it did not state as much and I could not find this.    "

## 2025-06-27 NOTE — INTERVAL H&P NOTE
H&P reviewed. After examining the patient I find no changes in the patients condition since the H&P had been written.    Vitals:    06/27/25 0941   BP: 149/91   Pulse: 65   Resp: 18   Temp: (!) 97.4 °F (36.3 °C)   SpO2: 97%

## 2025-06-27 NOTE — TELEPHONE ENCOUNTER
Was unable to get in touch with patient tonight- please reach out in am if able and let her know if she needs ride home arranged for her procedure tomorrow 6/27 thanks!    Reason for Disposition   Third attempt to contact caller AND no contact made. Phone number verified.    Protocols used: No Contact or Duplicate Contact Call-Adult-DARRELL

## 2025-07-01 ENCOUNTER — TELEPHONE (OUTPATIENT)
Age: 75
End: 2025-07-01

## 2025-07-01 NOTE — TELEPHONE ENCOUNTER
Caller: Patient    Doctor: Dr. Sarmiento     Reason for call: Returning nurse call       Please assist     Call back#: [phone]      Patient

## 2025-07-01 NOTE — TELEPHONE ENCOUNTER
Caller: Therese     Doctor: Dr. Sarmiento     Reason for call: Patient calling stating procedure did not work and would like to see if she can schedule procedure for the other side     Call back#: [phone]

## 2025-07-02 NOTE — TELEPHONE ENCOUNTER
S/w the patient and she stated that she has had no relief so far with the SIJI performed last week. Reviewed the postoperative instructions with her again and stated that we will call her next week to see how she is doing with it. It could take up too 2 weeks to receive the full effects of the medication. She stated she is coming in next week for the Left side but she didn't know if that is what you would like to do because she feels like now, the pain could be radiating down the LLE to the knee and inquired about sciatica pain and she stated she thinks so. Reviewed that unfortunately I do not know the etiology of her  disease and I would follow up c/ SJ. Thank you

## 2025-07-02 NOTE — TELEPHONE ENCOUNTER
Caller: lety Saleh    Doctor: Dr. Sarmiento    Reason for call: pt returning the nurse's call.  Pt stated you can also try sending her a my chart message    Call back#: [phone]

## 2025-07-10 ENCOUNTER — OFFICE VISIT (OUTPATIENT)
Dept: PAIN MEDICINE | Facility: CLINIC | Age: 75
End: 2025-07-10
Payer: MEDICARE

## 2025-07-10 DIAGNOSIS — M47.818 SACRAL SPONDYLOSIS: ICD-10-CM

## 2025-07-10 DIAGNOSIS — M46.1 SACROILIITIS (HCC): Primary | ICD-10-CM

## 2025-07-10 DIAGNOSIS — G89.4 CHRONIC PAIN SYNDROME: ICD-10-CM

## 2025-07-10 PROCEDURE — 99214 OFFICE O/P EST MOD 30 MIN: CPT | Performed by: STUDENT IN AN ORGANIZED HEALTH CARE EDUCATION/TRAINING PROGRAM

## 2025-07-10 PROCEDURE — G2211 COMPLEX E/M VISIT ADD ON: HCPCS | Performed by: STUDENT IN AN ORGANIZED HEALTH CARE EDUCATION/TRAINING PROGRAM

## 2025-07-10 NOTE — PROGRESS NOTES
Name: Therese Iglesias      : 1950      MRN: [id number]  Encounter Provider: Fernando Sarmiento MD  Encounter Date: 7/10/2025   Encounter department: [facility]  :  Assessment & Plan  Sacroiliitis (HCC)  -Schedule for bilateral S1-S3 MBB to RFA pathway.        Sacral spondylosis  -Schedule for bilateral S1-S3 MBB to RFA pathway.        Chronic pain syndrome           Patient is a pleasant 74-year-old woman who presents as a follow-up visit after last being seen on 2025 where she underwent a sacroiliac injection.  Unfortunate patient had received significant benefit from this states her symptoms are the same.  Her pain is a 3-10 out of 10 especially when she is sitting or driving.  The pain is constant in quality pain is a burning sensation the pain does interfere with her daily activities.    Given patient's lack of benefit from sacroiliac joint injection we will trial bilateral S1-S3 MBB to RFA pathway.  Patient counseled risk and benefits of injection therapy like to proceed.  Complete risks and benefits including bleeding, infection, tissue reaction, nerve injury and allergic reaction were discussed. The approach was demonstrated using models and literature was provided. Verbal and written consent was obtained.    My impressions and treatment recommendations were discussed in detail with the patient who verbalized understanding and had no further questions.  Discharge instructions were provided. I personally saw and examined the patient and I agree with the above discussed plan of care.    History of Present Illness     Therese Iglesias is a 74 y.o. female who presents for a follow up office visit in regards to No chief complaint on file..  Patient is a pleasant 74-year-old woman who presents as a follow-up visit after last being seen on 2025 where she underwent a sacroiliac injection.  Unfortunate patient had received significant benefit from this states her symptoms are the  same.  Her pain is a 3-10 out of 10 especially when she is sitting or driving.  The pain is constant in quality pain is a burning sensation the pain does interfere with her daily activities.        Opioid contract date    Last UDS Date: No results in last 5 years                    last taken on    Review of Systems   Constitutional:  Negative for chills, fatigue and unexpected weight change.   HENT:  Negative for ear pain, mouth sores and sinus pressure.    Eyes:  Negative for pain, redness and visual disturbance.   Respiratory:  Negative for shortness of breath and wheezing.    Cardiovascular:  Negative for chest pain and palpitations.   Gastrointestinal:  Negative for abdominal pain and nausea.   Endocrine: Negative for polyphagia.   Musculoskeletal:  Negative for arthralgias, back pain and neck pain.        Decreased ROM, joint and muscle pain   Skin:  Negative for wound.   Neurological:  Positive for weakness. Negative for seizures and numbness.   Hematological:  Adenopathy: right arm.   Psychiatric/Behavioral:  Positive for sleep disturbance. Negative for decreased concentration and dysphoric mood.        Medical History Reviewed by provider this encounter:  Tobacco  Allergies  Meds  Problems  Med Hx  Surg Hx  Fam Hx     .       Objective   There were no vitals taken for this visit.     Pain Score:   3  Physical Exam  Constitutional: normal, well developed, well nourished, alert, in no distress and non-toxic and no overt pain behavior.  Eyes: anicteric  HEENT: grossly intact  Neck: supple, symmetric, trachea midline and no masses   Pulmonary: even and unlabored  Cardiovascular: No edema or pitting edema present  Skin: Normal without rashes or lesions and well hydrated  Psychiatric: Mood and affect appropriate  Neurologic: Cranial Nerves II-XII grossly intact  Musculoskeletal: normal gait. Pain with sitting.

## 2025-07-10 NOTE — H&P (VIEW-ONLY)
Name: Therese Iglesias      : 1950      MRN: [id number]  Encounter Provider: Fernando Sarmiento MD  Encounter Date: 7/10/2025   Encounter department: [facility]  :  Assessment & Plan  Sacroiliitis (HCC)  -Schedule for bilateral S1-S3 MBB to RFA pathway.        Sacral spondylosis  -Schedule for bilateral S1-S3 MBB to RFA pathway.        Chronic pain syndrome           Patient is a pleasant 74-year-old woman who presents as a follow-up visit after last being seen on 2025 where she underwent a sacroiliac injection.  Unfortunate patient had received significant benefit from this states her symptoms are the same.  Her pain is a 3-10 out of 10 especially when she is sitting or driving.  The pain is constant in quality pain is a burning sensation the pain does interfere with her daily activities.    Given patient's lack of benefit from sacroiliac joint injection we will trial bilateral S1-S3 MBB to RFA pathway.  Patient counseled risk and benefits of injection therapy like to proceed.  Complete risks and benefits including bleeding, infection, tissue reaction, nerve injury and allergic reaction were discussed. The approach was demonstrated using models and literature was provided. Verbal and written consent was obtained.    My impressions and treatment recommendations were discussed in detail with the patient who verbalized understanding and had no further questions.  Discharge instructions were provided. I personally saw and examined the patient and I agree with the above discussed plan of care.    History of Present Illness     Therese Iglesias is a 74 y.o. female who presents for a follow up office visit in regards to No chief complaint on file..  Patient is a pleasant 74-year-old woman who presents as a follow-up visit after last being seen on 2025 where she underwent a sacroiliac injection.  Unfortunate patient had received significant benefit from this states her symptoms are the  same.  Her pain is a 3-10 out of 10 especially when she is sitting or driving.  The pain is constant in quality pain is a burning sensation the pain does interfere with her daily activities.        Opioid contract date    Last UDS Date: No results in last 5 years                    last taken on    Review of Systems   Constitutional:  Negative for chills, fatigue and unexpected weight change.   HENT:  Negative for ear pain, mouth sores and sinus pressure.    Eyes:  Negative for pain, redness and visual disturbance.   Respiratory:  Negative for shortness of breath and wheezing.    Cardiovascular:  Negative for chest pain and palpitations.   Gastrointestinal:  Negative for abdominal pain and nausea.   Endocrine: Negative for polyphagia.   Musculoskeletal:  Negative for arthralgias, back pain and neck pain.        Decreased ROM, joint and muscle pain   Skin:  Negative for wound.   Neurological:  Positive for weakness. Negative for seizures and numbness.   Hematological:  Adenopathy: right arm.   Psychiatric/Behavioral:  Positive for sleep disturbance. Negative for decreased concentration and dysphoric mood.        Medical History Reviewed by provider this encounter:  Tobacco  Allergies  Meds  Problems  Med Hx  Surg Hx  Fam Hx     .       Objective   There were no vitals taken for this visit.     Pain Score:   3  Physical Exam  Constitutional: normal, well developed, well nourished, alert, in no distress and non-toxic and no overt pain behavior.  Eyes: anicteric  HEENT: grossly intact  Neck: supple, symmetric, trachea midline and no masses   Pulmonary: even and unlabored  Cardiovascular: No edema or pitting edema present  Skin: Normal without rashes or lesions and well hydrated  Psychiatric: Mood and affect appropriate  Neurologic: Cranial Nerves II-XII grossly intact  Musculoskeletal: normal gait. Pain with sitting.

## 2025-07-16 DIAGNOSIS — G89.29 CHRONIC MIDLINE LOW BACK PAIN WITHOUT SCIATICA: ICD-10-CM

## 2025-07-16 DIAGNOSIS — M54.50 CHRONIC MIDLINE LOW BACK PAIN WITHOUT SCIATICA: ICD-10-CM

## 2025-07-16 NOTE — TELEPHONE ENCOUNTER
Medication: gabapentin (NEURONTIN) 300 mg capsule     Dose/Frequency: Take 1 capsule (300 mg total) by mouth 2 (two) times a day     Quantity: 180    Pharmacy: [facility]    Office:   [x] PCP/Provider - Karina Galvez MD   [] Speciality/Provider -     Does the patient have enough for 3 days?   [x] Yes   [] No - Send as HP to POD

## 2025-07-17 RX ORDER — GABAPENTIN 300 MG/1
300 CAPSULE ORAL 2 TIMES DAILY
Qty: 60 CAPSULE | Refills: 0 | Status: SHIPPED | OUTPATIENT
Start: 2025-07-17

## 2025-07-25 ENCOUNTER — HOSPITAL ENCOUNTER (OUTPATIENT)
Facility: AMBULARY SURGERY CENTER | Age: 75
Setting detail: OUTPATIENT SURGERY
Discharge: HOME | End: 2025-07-25
Attending: STUDENT IN AN ORGANIZED HEALTH CARE EDUCATION/TRAINING PROGRAM | Admitting: STUDENT IN AN ORGANIZED HEALTH CARE EDUCATION/TRAINING PROGRAM
Payer: MEDICARE

## 2025-07-25 ENCOUNTER — APPOINTMENT (OUTPATIENT)
Dept: RADIOLOGY | Facility: HOSPITAL | Age: 75
End: 2025-07-25
Payer: MEDICARE

## 2025-07-25 VITALS
OXYGEN SATURATION: 96 % | DIASTOLIC BLOOD PRESSURE: 88 MMHG | HEART RATE: 95 BPM | SYSTOLIC BLOOD PRESSURE: 147 MMHG | TEMPERATURE: 97.5 F | RESPIRATION RATE: 18 BRPM

## 2025-07-25 PROBLEM — M47.818 SACRAL SPONDYLOSIS: Status: ACTIVE | Noted: 2025-07-25

## 2025-07-25 PROCEDURE — 64493 INJ PARAVERT F JNT L/S 1 LEV: CPT | Performed by: STUDENT IN AN ORGANIZED HEALTH CARE EDUCATION/TRAINING PROGRAM

## 2025-07-25 PROCEDURE — 64494 INJ PARAVERT F JNT L/S 2 LEV: CPT | Performed by: STUDENT IN AN ORGANIZED HEALTH CARE EDUCATION/TRAINING PROGRAM

## 2025-07-25 RX ORDER — LIDOCAINE HYDROCHLORIDE 10 MG/ML
INJECTION, SOLUTION EPIDURAL; INFILTRATION; INTRACAUDAL; PERINEURAL AS NEEDED
Status: DISCONTINUED | OUTPATIENT
Start: 2025-07-25 | End: 2025-07-25 | Stop reason: HOSPADM

## 2025-07-25 RX ORDER — BUPIVACAINE HYDROCHLORIDE 5 MG/ML
INJECTION, SOLUTION EPIDURAL; INTRACAUDAL; PERINEURAL AS NEEDED
Status: DISCONTINUED | OUTPATIENT
Start: 2025-07-25 | End: 2025-07-25 | Stop reason: HOSPADM

## 2025-07-25 NOTE — INTERVAL H&P NOTE
H&P reviewed. After examining the patient I find no changes in the patients condition since the H&P had been written.    Vitals:    07/25/25 0956   BP: 125/75   Pulse: 89   Resp: 16   Temp: 97.5 °F (36.4 °C)   SpO2: 98%

## 2025-07-25 NOTE — OP NOTE
Pre-procedure Diagnosis: Sacroilitis  Post-procedure Diagnosis: Sacroilitis   Procedure Title(s):  bilateral S1-S3 medial branch nerve blocks   Attending Surgeon:   Fernando Sarmiento MD  Anesthesia:   Local     Indications: The patient is a 74 y.o. year-old female with a diagnosis of lumbar facet arthropathy. The patient's history and physical exam were reviewed. The risks, benefits and alternatives to the procedure were discussed, and all questions were answered to the patient's satisfaction. The patient agreed to proceed, and written informed consent was obtained.    Procedure in Detail: The patient was brought into the procedure room and placed in the prone position on the fluoroscopy table. The area of the lumbar spine was prepped with chloraprep and then draped in a sterile manner.      AP fluoroscopy was used to identify the S1-S3 foramen bilaterally. levels on the .  The sacral ala was identified and marked. The skin in these identified areas was anesthetized with 1% lidocaine. A 25-gauge, 3½-inch spinal needle was advanced toward  the lateral edge of the S1-S3 foramen  under fluoroscopic guidance. Once bone was contacted, negative aspiration was confirmed and [0.75-mL] of [bupivacaine 0.5%] was injected at each level.      After the procedure was completed, the patient's back was cleaned and bandages were placed at the needle insertion sites.    Disposition: The patient tolerated the procedure well, and there were no apparent complications. The patient was taken to the recovery area where written discharge instructions for the procedure were given. The patient was given a pain diary to determine if the patient's pain improves following the injection. Once the diary is returned we will consider next appropriate course of treatment.    Estimated Blood Loss: None  Specimens Obtained: N/A

## 2025-07-25 NOTE — DISCHARGE INSTRUCTIONS

## 2025-08-01 ENCOUNTER — TELEPHONE (OUTPATIENT)
Dept: PAIN MEDICINE | Facility: CLINIC | Age: 75
End: 2025-08-01

## 2025-08-22 ENCOUNTER — HOSPITAL ENCOUNTER (OUTPATIENT)
Facility: AMBULARY SURGERY CENTER | Age: 75
Setting detail: OUTPATIENT SURGERY
Discharge: HOME | End: 2025-08-22
Attending: STUDENT IN AN ORGANIZED HEALTH CARE EDUCATION/TRAINING PROGRAM | Admitting: STUDENT IN AN ORGANIZED HEALTH CARE EDUCATION/TRAINING PROGRAM
Payer: MEDICARE

## 2025-08-22 ENCOUNTER — APPOINTMENT (OUTPATIENT)
Dept: RADIOLOGY | Facility: HOSPITAL | Age: 75
End: 2025-08-22
Payer: MEDICARE

## 2025-08-22 VITALS
DIASTOLIC BLOOD PRESSURE: 82 MMHG | HEART RATE: 98 BPM | OXYGEN SATURATION: 96 % | TEMPERATURE: 97.3 F | WEIGHT: 132 LBS | SYSTOLIC BLOOD PRESSURE: 139 MMHG | BODY MASS INDEX: 18.48 KG/M2 | HEIGHT: 71 IN | RESPIRATION RATE: 18 BRPM

## 2025-08-22 PROCEDURE — 64494 INJ PARAVERT F JNT L/S 2 LEV: CPT | Performed by: STUDENT IN AN ORGANIZED HEALTH CARE EDUCATION/TRAINING PROGRAM

## 2025-08-22 PROCEDURE — 64493 INJ PARAVERT F JNT L/S 1 LEV: CPT | Performed by: STUDENT IN AN ORGANIZED HEALTH CARE EDUCATION/TRAINING PROGRAM

## 2025-08-22 RX ORDER — BUPIVACAINE HYDROCHLORIDE 5 MG/ML
INJECTION, SOLUTION EPIDURAL; INTRACAUDAL; PERINEURAL AS NEEDED
Status: DISCONTINUED | OUTPATIENT
Start: 2025-08-22 | End: 2025-08-22 | Stop reason: HOSPADM

## 2025-08-22 RX ORDER — LIDOCAINE HYDROCHLORIDE 10 MG/ML
INJECTION, SOLUTION EPIDURAL; INFILTRATION; INTRACAUDAL; PERINEURAL AS NEEDED
Status: DISCONTINUED | OUTPATIENT
Start: 2025-08-22 | End: 2025-08-22 | Stop reason: HOSPADM

## (undated) DEVICE — TRAY PAIN SUPPORT

## (undated) DEVICE — SKIN MARKER DUAL TIP WITH RULER CAP, FLEXIBLE RULER AND LABELS: Brand: DEVON

## (undated) DEVICE — PLASTIC ADHESIVE BANDAGE: Brand: CURITY

## (undated) DEVICE — TOWEL SET X-RAY

## (undated) DEVICE — GLOVE SRG BIOGEL 7.5

## (undated) DEVICE — SYRINGE 10ML LL

## (undated) DEVICE — Device: Brand: PORTEX

## (undated) DEVICE — RADIOLOGY STERILE LABELS: Brand: CENTURION

## (undated) DEVICE — NEEDLE SPINAL 22G X 3.5IN  QUINCKE

## (undated) DEVICE — Device

## (undated) DEVICE — CHLORAPREP APPLICATOR TINTED 10.5ML ONE-STEP

## (undated) DEVICE — SYRINGE 5ML LL

## (undated) DEVICE — FLEXIBLE ADHESIVE BANDAGE,X-LARGE: Brand: CURITY

## (undated) DEVICE — GLOVE SRG LF STRL BGL SKNSNS 7.5 PF

## (undated) DEVICE — IV SET EXT SM BORE CARESITE 8IN

## (undated) DEVICE — TOWEL SURG XR DETECT GREEN STRL RFD

## (undated) DEVICE — SYRINGE 3ML LL